# Patient Record
Sex: FEMALE | Race: BLACK OR AFRICAN AMERICAN | NOT HISPANIC OR LATINO
[De-identification: names, ages, dates, MRNs, and addresses within clinical notes are randomized per-mention and may not be internally consistent; named-entity substitution may affect disease eponyms.]

---

## 2022-04-20 ENCOUNTER — APPOINTMENT (OUTPATIENT)
Dept: OBGYN | Facility: CLINIC | Age: 32
End: 2022-04-20
Payer: COMMERCIAL

## 2022-04-20 ENCOUNTER — NON-APPOINTMENT (OUTPATIENT)
Age: 32
End: 2022-04-20

## 2022-04-20 ENCOUNTER — TRANSCRIPTION ENCOUNTER (OUTPATIENT)
Age: 32
End: 2022-04-20

## 2022-04-20 ENCOUNTER — APPOINTMENT (OUTPATIENT)
Dept: OBGYN | Facility: CLINIC | Age: 32
End: 2022-04-20
Payer: SELF-PAY

## 2022-04-20 ENCOUNTER — ASOB RESULT (OUTPATIENT)
Age: 32
End: 2022-04-20

## 2022-04-20 VITALS
SYSTOLIC BLOOD PRESSURE: 132 MMHG | WEIGHT: 167 LBS | HEART RATE: 101 BPM | BODY MASS INDEX: 29.59 KG/M2 | DIASTOLIC BLOOD PRESSURE: 89 MMHG | HEIGHT: 63 IN | TEMPERATURE: 98 F

## 2022-04-20 DIAGNOSIS — Z78.9 OTHER SPECIFIED HEALTH STATUS: ICD-10-CM

## 2022-04-20 DIAGNOSIS — Z82.49 FAMILY HISTORY OF ISCHEMIC HEART DISEASE AND OTHER DISEASES OF THE CIRCULATORY SYSTEM: ICD-10-CM

## 2022-04-20 DIAGNOSIS — Z32.01 ENCOUNTER FOR PREGNANCY TEST, RESULT POSITIVE: ICD-10-CM

## 2022-04-20 DIAGNOSIS — N91.2 AMENORRHEA, UNSPECIFIED: ICD-10-CM

## 2022-04-20 DIAGNOSIS — Z83.3 FAMILY HISTORY OF DIABETES MELLITUS: ICD-10-CM

## 2022-04-20 PROBLEM — Z00.00 ENCOUNTER FOR PREVENTIVE HEALTH EXAMINATION: Status: ACTIVE | Noted: 2022-04-20

## 2022-04-20 LAB
BILIRUB UR QL STRIP: NORMAL
CLARITY UR: CLEAR
COLLECTION METHOD: NORMAL
GLUCOSE UR-MCNC: NORMAL
HCG UR QL: 0.2 EU/DL
HCG UR QL: POSITIVE
HGB UR QL STRIP.AUTO: ABNORMAL
KETONES UR-MCNC: NORMAL
LEUKOCYTE ESTERASE UR QL STRIP: ABNORMAL
NITRITE UR QL STRIP: NORMAL
PH UR STRIP: 6
PROT UR STRIP-MCNC: NORMAL
QUALITY CONTROL: YES
SP GR UR STRIP: 1.01

## 2022-04-20 PROCEDURE — 76817 TRANSVAGINAL US OBSTETRIC: CPT

## 2022-04-20 PROCEDURE — 81003 URINALYSIS AUTO W/O SCOPE: CPT | Mod: QW

## 2022-04-20 PROCEDURE — 99204 OFFICE O/P NEW MOD 45 MIN: CPT

## 2022-04-20 PROCEDURE — 99072 ADDL SUPL MATRL&STAF TM PHE: CPT

## 2022-04-20 PROCEDURE — 81025 URINE PREGNANCY TEST: CPT

## 2022-04-20 RX ORDER — PRENATAL VIT 49/IRON FUM/FOLIC 6.75-0.2MG
TABLET ORAL
Refills: 0 | Status: ACTIVE | COMMUNITY

## 2022-04-20 NOTE — HISTORY OF PRESENT ILLNESS
[Patient reported PAP Smear was normal] : Patient reported PAP Smear was normal [Y] : Positive pregnancy history [FreeTextEntry1] : 31 yo G2 P-0010 LMP- 2-6-2022 IS here for initial visit , prenatal care \par \par obhx - sab 6 wks  10/2021\par  no rashes or viral iness since lmp, \par 2/28 had a drink 1 mixed\par no sti\par no genetic d/s  \par patient and partner from [place]  [TextBox_4] : h/o fibroids , no sti , no cysts\par  \par 11/30/4-5 \par  last pap 4/1/2022, nl in 2019  [Papeardate] : 4-2022 [PGxTotal] : 1 [[facility]xLiving] : 0 [PGHxABSpont] : 1

## 2022-04-20 NOTE — DISCUSSION/SUMMARY
[FreeTextEntry1] : 33 yo p0010 AT 10.3 WKS IUP for prenatal care lmp 2/6 KALIN 11/13/22\par \par sono - siup at 10.6 wks kalin 11/10\par prenatal labs \par panorama\par NT in 2-3 wks\par pregnanc counseling\par records from dr Kyung Pres[place]\par rto 1 mo\par

## 2022-04-20 NOTE — PHYSICAL EXAM
[Appropriately responsive] : appropriately responsive [Alert] : alert [No Acute Distress] : no acute distress [No Lymphadenopathy] : no lymphadenopathy [Soft] : soft [Non-tender] : non-tender [Non-distended] : non-distended [No HSM] : No HSM [No Lesions] : no lesions [No Mass] : no mass [Oriented x3] : oriented x3 [FreeTextEntry6] : w

## 2022-04-20 NOTE — PROCEDURE
[Amenorrhea] : Amenorrhea [Transvaginal Ultrasound] : transvaginal ultrasound [FreeTextEntry4] : siup at 10.6 wks brenton 11/10/22 fhr pos

## 2022-04-21 LAB
ABO + RH PNL BLD: NORMAL
BASOPHILS # BLD AUTO: 0.03 K/UL
BASOPHILS NFR BLD AUTO: 0.3 %
BLD GP AB SCN SERPL QL: NORMAL
EOSINOPHIL # BLD AUTO: 0 K/UL
EOSINOPHIL NFR BLD AUTO: 0 %
HCT VFR BLD CALC: 37.7 %
HGB BLD-MCNC: 11.8 G/DL
HIV1+2 AB SPEC QL IA.RAPID: NONREACTIVE
IMM GRANULOCYTES NFR BLD AUTO: 0.3 %
LYMPHOCYTES # BLD AUTO: 1.85 K/UL
LYMPHOCYTES NFR BLD AUTO: 15.5 %
MAN DIFF?: NORMAL
MCHC RBC-ENTMCNC: 31.3 G/DL
MCHC RBC-ENTMCNC: 32.3 PG
MCV RBC AUTO: 103.3 FL
MONOCYTES # BLD AUTO: 0.69 K/UL
MONOCYTES NFR BLD AUTO: 5.8 %
NEUTROPHILS # BLD AUTO: 9.3 K/UL
NEUTROPHILS NFR BLD AUTO: 78.1 %
PLATELET # BLD AUTO: 286 K/UL
RBC # BLD: 3.65 M/UL
RBC # FLD: 13.2 %
TSH SERPL-ACNC: 1.68 UIU/ML
WBC # FLD AUTO: 11.91 K/UL

## 2022-04-22 LAB
HBV SURFACE AB SER QL: REACTIVE
HBV SURFACE AG SER QL: NONREACTIVE
HCV AB SER QL: NONREACTIVE
HCV S/CO RATIO: 0.13 S/CO
LEAD BLD-MCNC: <1 UG/DL
MEV IGG FLD QL IA: >300 AU/ML
MEV IGG+IGM SER-IMP: POSITIVE
RUBV IGG FLD-ACNC: 10.2 INDEX
RUBV IGG SER-IMP: POSITIVE
T GONDII AB SER-IMP: NEGATIVE
T GONDII AB SER-IMP: NEGATIVE
T GONDII IGG SER QL: <3 IU/ML
T GONDII IGM SER QL: <3 AU/ML
T PALLIDUM AB SER QL IA: NEGATIVE
VZV AB TITR SER: NEGATIVE
VZV IGG SER IF-ACNC: 23.8 INDEX

## 2022-04-26 LAB
HGB A MFR BLD: 97.5 %
HGB A2 MFR BLD: 2.5 %
HGB FRACT BLD-IMP: NORMAL

## 2022-04-27 DIAGNOSIS — O23.40 UNSPECIFIED INFECTION OF URINARY TRACT IN PREGNANCY, UNSPECIFIED TRIMESTER: ICD-10-CM

## 2022-04-27 LAB
AR GENE MUT ANL BLD/T: NORMAL
FMR1 GENE MUT ANL BLD/T: NORMAL

## 2022-04-27 RX ORDER — NITROFURANTOIN (MONOHYDRATE/MACROCRYSTALS) 25; 75 MG/1; MG/1
100 CAPSULE ORAL
Qty: 14 | Refills: 0 | Status: ACTIVE | COMMUNITY
Start: 2022-04-27 | End: 1900-01-01

## 2022-05-02 LAB — CFTR MUT TESTED BLD/T: NEGATIVE

## 2022-05-11 ENCOUNTER — OUTPATIENT (OUTPATIENT)
Dept: OUTPATIENT SERVICES | Facility: HOSPITAL | Age: 32
LOS: 1 days | Discharge: HOME | End: 2022-05-11

## 2022-05-11 ENCOUNTER — ASOB RESULT (OUTPATIENT)
Age: 32
End: 2022-05-11

## 2022-05-11 ENCOUNTER — APPOINTMENT (OUTPATIENT)
Dept: ANTEPARTUM | Facility: CLINIC | Age: 32
End: 2022-05-11
Payer: COMMERCIAL

## 2022-05-11 PROCEDURE — 76813 OB US NUCHAL MEAS 1 GEST: CPT | Mod: 26

## 2022-05-18 ENCOUNTER — EMERGENCY (EMERGENCY)
Facility: HOSPITAL | Age: 32
LOS: 0 days | Discharge: HOME | End: 2022-05-19
Attending: EMERGENCY MEDICINE | Admitting: EMERGENCY MEDICINE
Payer: COMMERCIAL

## 2022-05-18 VITALS
SYSTOLIC BLOOD PRESSURE: 147 MMHG | RESPIRATION RATE: 16 BRPM | WEIGHT: 166.89 LBS | OXYGEN SATURATION: 100 % | DIASTOLIC BLOOD PRESSURE: 81 MMHG | HEART RATE: 130 BPM

## 2022-05-18 DIAGNOSIS — Z87.59 PERSONAL HISTORY OF OTHER COMPLICATIONS OF PREGNANCY, CHILDBIRTH AND THE PUERPERIUM: ICD-10-CM

## 2022-05-18 DIAGNOSIS — O34.12 MATERNAL CARE FOR BENIGN TUMOR OF CORPUS UTERI, SECOND TRIMESTER: ICD-10-CM

## 2022-05-18 DIAGNOSIS — O99.891 OTHER SPECIFIED DISEASES AND CONDITIONS COMPLICATING PREGNANCY: ICD-10-CM

## 2022-05-18 DIAGNOSIS — R10.2 PELVIC AND PERINEAL PAIN: ICD-10-CM

## 2022-05-18 DIAGNOSIS — Z3A.14 14 WEEKS GESTATION OF PREGNANCY: ICD-10-CM

## 2022-05-18 DIAGNOSIS — O20.9 HEMORRHAGE IN EARLY PREGNANCY, UNSPECIFIED: ICD-10-CM

## 2022-05-18 DIAGNOSIS — D25.1 INTRAMURAL LEIOMYOMA OF UTERUS: ICD-10-CM

## 2022-05-18 PROCEDURE — 99285 EMERGENCY DEPT VISIT HI MDM: CPT

## 2022-05-18 PROCEDURE — 93010 ELECTROCARDIOGRAM REPORT: CPT

## 2022-05-18 NOTE — ED PROVIDER NOTE - NS ED ATTENDING STATEMENT MOD
This was a shared visit with the SARA. I reviewed and verified the documentation and independently performed the documented:

## 2022-05-18 NOTE — ED PROVIDER NOTE - OBJECTIVE STATEMENT
32 year old female, ~ 14 weeks pregnant, lmp 2/6 presents to ed with vaginal bleeding x few hours. pt admits went through 1 panty liner. Mild pelvic cramping. No nausea, vomiting, diarrhea, fever, chills, chest pain or sob. no urinary symptoms. OB - dr maza

## 2022-05-18 NOTE — ED ADULT TRIAGE NOTE - CHIEF COMPLAINT QUOTE
pt c/o vaginal bleeding pt 14 weeks pregnant  LMP 2/6 ; recent ultrasound done and was told everything was ok; pt states she is having pelvic cramping and lower back pain; pt states she went through one panty liner today

## 2022-05-18 NOTE — ED PROVIDER NOTE - ATTENDING APP SHARED VISIT CONTRIBUTION OF CARE
pt co ab cramps, mild bleeding today. 14 weeks preg. hx of miscarriage in Oct. no fever, vomiting. no uti sx.  pt in nad, ab soft, nt.     labs, US for FH, supportive care    outpt follow up

## 2022-05-18 NOTE — ED PROVIDER NOTE - NS ED ROS FT
Review of Systems:  	•	CONSTITUTIONAL - no fever, no diaphoresis, no chills  	•	SKIN - no rash  	•	HEMATOLOGIC - + bleeding, no bruising  	•	EYES - no eye pain, no blurry vision  	•	ENT - no change in hearing, no sore throat, no ear pain or tinnitus  	•	RESPIRATORY - no shortness of breath, no cough  	•	CARDIAC - no chest pain, no palpitations  	•	GI - no abd pain, no nausea, no vomiting, no diarrhea, no constipation  	•	GENITO-URINARY - no discharge, no dysuria; no hematuria, no increased urinary frequency  	•	MUSCULOSKELETAL - no joint paint, no swelling, no redness  	•	NEUROLOGIC - no weakness, no headache, no paresthesias, no LOC  	•	PSYCH - no anxiety, non suicidal, non homicidal, no hallucination, no depression

## 2022-05-18 NOTE — ED PROVIDER NOTE - PATIENT PORTAL LINK FT
You can access the FollowMyHealth Patient Portal offered by [facility] by registering at the following website: [email or web address]. By joining [facility]’s FollowMyHealth portal, you will also be able to view your health information using other applications (apps) compatible with our system.

## 2022-05-19 VITALS — HEART RATE: 105 BPM

## 2022-05-19 LAB
ALBUMIN SERPL ELPH-MCNC: 4.3 G/DL — SIGNIFICANT CHANGE UP (ref 3.5–5.2)
ALP SERPL-CCNC: 134 U/L — HIGH (ref 30–115)
ALT FLD-CCNC: 44 U/L — HIGH (ref 0–41)
ANION GAP SERPL CALC-SCNC: 16 MMOL/L — HIGH (ref 7–14)
APPEARANCE UR: CLEAR — SIGNIFICANT CHANGE UP
AST SERPL-CCNC: 24 U/L — SIGNIFICANT CHANGE UP (ref 0–41)
BACTERIA # UR AUTO: NEGATIVE — SIGNIFICANT CHANGE UP
BASOPHILS # BLD AUTO: 0.03 K/UL — SIGNIFICANT CHANGE UP (ref 0–0.2)
BASOPHILS NFR BLD AUTO: 0.2 % — SIGNIFICANT CHANGE UP (ref 0–1)
BILIRUB SERPL-MCNC: 0.2 MG/DL — SIGNIFICANT CHANGE UP (ref 0.2–1.2)
BILIRUB UR-MCNC: NEGATIVE — SIGNIFICANT CHANGE UP
BLD GP AB SCN SERPL QL: SIGNIFICANT CHANGE UP
BUN SERPL-MCNC: 8 MG/DL — LOW (ref 10–20)
CALCIUM SERPL-MCNC: 10 MG/DL — SIGNIFICANT CHANGE UP (ref 8.5–10.1)
CHLORIDE SERPL-SCNC: 101 MMOL/L — SIGNIFICANT CHANGE UP (ref 98–110)
CO2 SERPL-SCNC: 18 MMOL/L — SIGNIFICANT CHANGE UP (ref 17–32)
COLOR SPEC: COLORLESS — SIGNIFICANT CHANGE UP
CREAT SERPL-MCNC: 0.6 MG/DL — LOW (ref 0.7–1.5)
DIFF PNL FLD: ABNORMAL
EGFR: 123 ML/MIN/1.73M2 — SIGNIFICANT CHANGE UP
EOSINOPHIL # BLD AUTO: 0 K/UL — SIGNIFICANT CHANGE UP (ref 0–0.7)
EOSINOPHIL NFR BLD AUTO: 0 % — SIGNIFICANT CHANGE UP (ref 0–8)
EPI CELLS # UR: 1 /HPF — SIGNIFICANT CHANGE UP (ref 0–5)
GLUCOSE SERPL-MCNC: 93 MG/DL — SIGNIFICANT CHANGE UP (ref 70–99)
GLUCOSE UR QL: NEGATIVE — SIGNIFICANT CHANGE UP
HCG SERPL-ACNC: HIGH MIU/ML
HCT VFR BLD CALC: 35.3 % — LOW (ref 37–47)
HGB BLD-MCNC: 11.8 G/DL — LOW (ref 12–16)
HYALINE CASTS # UR AUTO: 0 /LPF — SIGNIFICANT CHANGE UP (ref 0–7)
IMM GRANULOCYTES NFR BLD AUTO: 1.2 % — HIGH (ref 0.1–0.3)
KETONES UR-MCNC: ABNORMAL
LEUKOCYTE ESTERASE UR-ACNC: NEGATIVE — SIGNIFICANT CHANGE UP
LYMPHOCYTES # BLD AUTO: 1.81 K/UL — SIGNIFICANT CHANGE UP (ref 1.2–3.4)
LYMPHOCYTES # BLD AUTO: 11.1 % — LOW (ref 20.5–51.1)
MCHC RBC-ENTMCNC: 31.9 PG — HIGH (ref 27–31)
MCHC RBC-ENTMCNC: 33.4 G/DL — SIGNIFICANT CHANGE UP (ref 32–37)
MCV RBC AUTO: 95.4 FL — SIGNIFICANT CHANGE UP (ref 81–99)
MONOCYTES # BLD AUTO: 0.96 K/UL — HIGH (ref 0.1–0.6)
MONOCYTES NFR BLD AUTO: 5.9 % — SIGNIFICANT CHANGE UP (ref 1.7–9.3)
NEUTROPHILS # BLD AUTO: 13.25 K/UL — HIGH (ref 1.4–6.5)
NEUTROPHILS NFR BLD AUTO: 81.6 % — HIGH (ref 42.2–75.2)
NITRITE UR-MCNC: NEGATIVE — SIGNIFICANT CHANGE UP
NRBC # BLD: 0 /100 WBCS — SIGNIFICANT CHANGE UP (ref 0–0)
PH UR: 6 — SIGNIFICANT CHANGE UP (ref 5–8)
PLATELET # BLD AUTO: 334 K/UL — SIGNIFICANT CHANGE UP (ref 130–400)
POTASSIUM SERPL-MCNC: 4.3 MMOL/L — SIGNIFICANT CHANGE UP (ref 3.5–5)
POTASSIUM SERPL-SCNC: 4.3 MMOL/L — SIGNIFICANT CHANGE UP (ref 3.5–5)
PROT SERPL-MCNC: 8.1 G/DL — HIGH (ref 6–8)
PROT UR-MCNC: NEGATIVE — SIGNIFICANT CHANGE UP
RBC # BLD: 3.7 M/UL — LOW (ref 4.2–5.4)
RBC # FLD: 12.8 % — SIGNIFICANT CHANGE UP (ref 11.5–14.5)
RBC CASTS # UR COMP ASSIST: 2 /HPF — SIGNIFICANT CHANGE UP (ref 0–4)
SODIUM SERPL-SCNC: 135 MMOL/L — SIGNIFICANT CHANGE UP (ref 135–146)
SP GR SPEC: 1.01 — LOW (ref 1.01–1.03)
UROBILINOGEN FLD QL: SIGNIFICANT CHANGE UP
WBC # BLD: 16.24 K/UL — HIGH (ref 4.8–10.8)
WBC # FLD AUTO: 16.24 K/UL — HIGH (ref 4.8–10.8)
WBC UR QL: 1 /HPF — SIGNIFICANT CHANGE UP (ref 0–5)

## 2022-05-19 PROCEDURE — 76815 OB US LIMITED FETUS(S): CPT | Mod: 26

## 2022-05-19 RX ORDER — SODIUM CHLORIDE 9 MG/ML
1000 INJECTION INTRAMUSCULAR; INTRAVENOUS; SUBCUTANEOUS ONCE
Refills: 0 | Status: COMPLETED | OUTPATIENT
Start: 2022-05-19 | End: 2022-05-19

## 2022-05-19 RX ADMIN — SODIUM CHLORIDE 1000 MILLILITER(S): 9 INJECTION INTRAMUSCULAR; INTRAVENOUS; SUBCUTANEOUS at 01:04

## 2022-05-20 LAB
CULTURE RESULTS: SIGNIFICANT CHANGE UP
SPECIMEN SOURCE: SIGNIFICANT CHANGE UP

## 2022-05-24 ENCOUNTER — APPOINTMENT (OUTPATIENT)
Dept: OBGYN | Facility: CLINIC | Age: 32
End: 2022-05-24
Payer: COMMERCIAL

## 2022-05-24 ENCOUNTER — NON-APPOINTMENT (OUTPATIENT)
Age: 32
End: 2022-05-24

## 2022-05-24 VITALS
DIASTOLIC BLOOD PRESSURE: 78 MMHG | SYSTOLIC BLOOD PRESSURE: 107 MMHG | TEMPERATURE: 97.3 F | HEART RATE: 91 BPM | HEIGHT: 63 IN | WEIGHT: 165 LBS | BODY MASS INDEX: 29.23 KG/M2

## 2022-05-24 LAB
BILIRUB UR QL STRIP: NORMAL
CLARITY UR: CLEAR
COLLECTION METHOD: NORMAL
GLUCOSE UR-MCNC: NORMAL
HCG UR QL: 0.2 EU/DL
HGB UR QL STRIP.AUTO: ABNORMAL
KETONES UR-MCNC: NORMAL
LEUKOCYTE ESTERASE UR QL STRIP: ABNORMAL
NITRITE UR QL STRIP: NORMAL
PH UR STRIP: 6
PROT UR STRIP-MCNC: NORMAL
SP GR UR STRIP: 1.01

## 2022-05-24 PROCEDURE — 81003 URINALYSIS AUTO W/O SCOPE: CPT | Mod: QW

## 2022-05-24 PROCEDURE — 0500F INITIAL PRENATAL CARE VISIT: CPT

## 2022-05-25 ENCOUNTER — NON-APPOINTMENT (OUTPATIENT)
Age: 32
End: 2022-05-25

## 2022-05-25 ENCOUNTER — EMERGENCY (EMERGENCY)
Facility: HOSPITAL | Age: 32
LOS: 0 days | Discharge: HOME | End: 2022-05-26
Attending: EMERGENCY MEDICINE | Admitting: EMERGENCY MEDICINE
Payer: COMMERCIAL

## 2022-05-25 VITALS
TEMPERATURE: 99 F | DIASTOLIC BLOOD PRESSURE: 73 MMHG | RESPIRATION RATE: 20 BRPM | SYSTOLIC BLOOD PRESSURE: 147 MMHG | WEIGHT: 164.91 LBS | HEIGHT: 64 IN | HEART RATE: 117 BPM | OXYGEN SATURATION: 100 %

## 2022-05-25 VITALS
TEMPERATURE: 98 F | HEART RATE: 91 BPM | DIASTOLIC BLOOD PRESSURE: 76 MMHG | OXYGEN SATURATION: 100 % | RESPIRATION RATE: 20 BRPM | SYSTOLIC BLOOD PRESSURE: 110 MMHG

## 2022-05-25 DIAGNOSIS — O20.9 HEMORRHAGE IN EARLY PREGNANCY, UNSPECIFIED: ICD-10-CM

## 2022-05-25 DIAGNOSIS — O20.0 THREATENED ABORTION: ICD-10-CM

## 2022-05-25 DIAGNOSIS — Z3A.15 15 WEEKS GESTATION OF PREGNANCY: ICD-10-CM

## 2022-05-25 DIAGNOSIS — D25.9 LEIOMYOMA OF UTERUS, UNSPECIFIED: ICD-10-CM

## 2022-05-25 DIAGNOSIS — Z87.42 PERSONAL HISTORY OF OTHER DISEASES OF THE FEMALE GENITAL TRACT: ICD-10-CM

## 2022-05-25 DIAGNOSIS — O34.12 MATERNAL CARE FOR BENIGN TUMOR OF CORPUS UTERI, SECOND TRIMESTER: ICD-10-CM

## 2022-05-25 DIAGNOSIS — R10.9 UNSPECIFIED ABDOMINAL PAIN: ICD-10-CM

## 2022-05-25 LAB
ALBUMIN SERPL ELPH-MCNC: 4.2 G/DL — SIGNIFICANT CHANGE UP (ref 3.5–5.2)
ALP SERPL-CCNC: 164 U/L — HIGH (ref 30–115)
ALT FLD-CCNC: 56 U/L — HIGH (ref 0–41)
ANION GAP SERPL CALC-SCNC: 11 MMOL/L — SIGNIFICANT CHANGE UP (ref 7–14)
APPEARANCE UR: CLEAR — SIGNIFICANT CHANGE UP
AST SERPL-CCNC: 45 U/L — HIGH (ref 0–41)
BACTERIA # UR AUTO: ABNORMAL
BASOPHILS # BLD AUTO: 0.03 K/UL — SIGNIFICANT CHANGE UP (ref 0–0.2)
BASOPHILS NFR BLD AUTO: 0.2 % — SIGNIFICANT CHANGE UP (ref 0–1)
BILIRUB SERPL-MCNC: 0.2 MG/DL — SIGNIFICANT CHANGE UP (ref 0.2–1.2)
BILIRUB UR-MCNC: NEGATIVE — SIGNIFICANT CHANGE UP
BUN SERPL-MCNC: 9 MG/DL — LOW (ref 10–20)
CALCIUM SERPL-MCNC: 10.1 MG/DL — SIGNIFICANT CHANGE UP (ref 8.5–10.1)
CHLORIDE SERPL-SCNC: 106 MMOL/L — SIGNIFICANT CHANGE UP (ref 98–110)
CO2 SERPL-SCNC: 19 MMOL/L — SIGNIFICANT CHANGE UP (ref 17–32)
COLOR SPEC: COLORLESS — SIGNIFICANT CHANGE UP
CREAT SERPL-MCNC: 0.6 MG/DL — LOW (ref 0.7–1.5)
DIFF PNL FLD: ABNORMAL
EGFR: 123 ML/MIN/1.73M2 — SIGNIFICANT CHANGE UP
EOSINOPHIL # BLD AUTO: 0 K/UL — SIGNIFICANT CHANGE UP (ref 0–0.7)
EOSINOPHIL NFR BLD AUTO: 0 % — SIGNIFICANT CHANGE UP (ref 0–8)
EPI CELLS # UR: SIGNIFICANT CHANGE UP
GLUCOSE SERPL-MCNC: 91 MG/DL — SIGNIFICANT CHANGE UP (ref 70–99)
GLUCOSE UR QL: NEGATIVE — SIGNIFICANT CHANGE UP
HCT VFR BLD CALC: 33.2 % — LOW (ref 37–47)
HGB BLD-MCNC: 11.2 G/DL — LOW (ref 12–16)
IMM GRANULOCYTES NFR BLD AUTO: 0.7 % — HIGH (ref 0.1–0.3)
KETONES UR-MCNC: NEGATIVE — SIGNIFICANT CHANGE UP
LEUKOCYTE ESTERASE UR-ACNC: ABNORMAL
LYMPHOCYTES # BLD AUTO: 1.91 K/UL — SIGNIFICANT CHANGE UP (ref 1.2–3.4)
LYMPHOCYTES # BLD AUTO: 14.3 % — LOW (ref 20.5–51.1)
MCHC RBC-ENTMCNC: 31.8 PG — HIGH (ref 27–31)
MCHC RBC-ENTMCNC: 33.7 G/DL — SIGNIFICANT CHANGE UP (ref 32–37)
MCV RBC AUTO: 94.3 FL — SIGNIFICANT CHANGE UP (ref 81–99)
MONOCYTES # BLD AUTO: 0.77 K/UL — HIGH (ref 0.1–0.6)
MONOCYTES NFR BLD AUTO: 5.8 % — SIGNIFICANT CHANGE UP (ref 1.7–9.3)
NEUTROPHILS # BLD AUTO: 10.56 K/UL — HIGH (ref 1.4–6.5)
NEUTROPHILS NFR BLD AUTO: 79 % — HIGH (ref 42.2–75.2)
NITRITE UR-MCNC: NEGATIVE — SIGNIFICANT CHANGE UP
NRBC # BLD: 0 /100 WBCS — SIGNIFICANT CHANGE UP (ref 0–0)
PH UR: 6.5 — SIGNIFICANT CHANGE UP (ref 5–8)
PLATELET # BLD AUTO: 442 K/UL — HIGH (ref 130–400)
POTASSIUM SERPL-MCNC: 5.1 MMOL/L — HIGH (ref 3.5–5)
POTASSIUM SERPL-SCNC: 5.1 MMOL/L — HIGH (ref 3.5–5)
PROT SERPL-MCNC: 8.2 G/DL — HIGH (ref 6–8)
PROT UR-MCNC: NEGATIVE — SIGNIFICANT CHANGE UP
RBC # BLD: 3.52 M/UL — LOW (ref 4.2–5.4)
RBC # FLD: 12.4 % — SIGNIFICANT CHANGE UP (ref 11.5–14.5)
RBC CASTS # UR COMP ASSIST: SIGNIFICANT CHANGE UP /HPF (ref 0–4)
SODIUM SERPL-SCNC: 136 MMOL/L — SIGNIFICANT CHANGE UP (ref 135–146)
SP GR SPEC: 1 — LOW (ref 1.01–1.03)
UROBILINOGEN FLD QL: SIGNIFICANT CHANGE UP
WBC # BLD: 13.36 K/UL — HIGH (ref 4.8–10.8)
WBC # FLD AUTO: 13.36 K/UL — HIGH (ref 4.8–10.8)
WBC UR QL: SIGNIFICANT CHANGE UP /HPF (ref 0–5)

## 2022-05-25 PROCEDURE — 99285 EMERGENCY DEPT VISIT HI MDM: CPT

## 2022-05-25 PROCEDURE — 76815 OB US LIMITED FETUS(S): CPT | Mod: 26

## 2022-05-25 NOTE — ED ADULT TRIAGE NOTE - CHIEF COMPLAINT QUOTE
I'm 15 weeks pregnant and I'm bleeding. I had a check up yesterday and everything was fine - patient

## 2022-05-25 NOTE — ED PROVIDER NOTE - PROGRESS NOTE DETAILS
GYN team in ED and evaluated, request official sono.   bedside sono performed. + FH and + fetal activity

## 2022-05-25 NOTE — ED PROVIDER NOTE - CARE PROVIDER_API CALL
Fiorella Pearson)  Obstetrics and Gynecology  [address] [place] [address], [address]  [place] [address]  Phone: [phone]  Fax: [phone]  Follow Up Time:

## 2022-05-25 NOTE — CONSULT NOTE ADULT - ASSESSMENT
INCOMPLETE NOTE 31yo  @15w3d, with vaginal bleeding, IUP with FH noted, pending work-up    INCOMPLETE NOTE 33yo  @15w3d, with vaginal bleeding, IUP with FH noted, currently clinically and hemodynamically stable    INCOMPLETE NOTE 31yo  @15w3d, with vaginal bleeding, IUP with FH noted, currently clinically and hemodynamically stable    -no acute ob intervention  -recommend tylenol for pain  -bleeding and pain precautions given  -please follow up with Dr. Pearson next week  -dispo per ED    Dr. Wong and Dr. Pearson aware

## 2022-05-25 NOTE — ED PROVIDER NOTE - NSFOLLOWUPINSTRUCTIONS_ED_ALL_ED_FT
Vaginal Bleeding During Pregnancy, Second Trimester    A small amount of bleeding from the vagina, or spotting, is common during early pregnancy. Some bleeding may be related to the pregnancy, and some may not. In many cases, the bleeding is normal and is not a problem. However, bleeding can also be a sign of something serious.    Normal things may cause bleeding during the second trimester. They include:  •Rapid changes in blood vessels. This is caused by changes that are happening to the body during pregnancy.  •Sex.  •Pelvic exams.    Abnormal things that may cause bleeding during the second trimester include:  •Infection, inflammation, or growths on the cervix. The growths on the cervix are also called polyps.  •A condition in which the placenta partially or completely covers the opening of the cervix (placenta previa).  •The placenta  from the uterus (placenta abruption).  •Miscarriage.  •Early labor, also called  labor.  •The cervix opening and thinning before pregnancy is at term and before labor starts (cervical insufficiency).  •A mass of tissue developing in the uterus due to an egg being fertilized incorrectly (molar pregnancy).    Tell your health care provider about any vaginal bleeding right away.    Follow these instructions at home:    Monitoring your bleeding      Monitor your bleeding.  •Pay attention to any changes in your symptoms. Let your health care provider know about any concerns.  •Try to understand when the bleeding occurs. Does the bleeding start on its own, or does it start after something is done, such as sex or a pelvic exam?  •Use a diary to record the things you see about your bleeding, including:  •The kind of bleeding you are having. Does the bleeding start and stop irregularly, or is it a constant flow?  •The severity of your bleeding. Is the bleeding heavy or light?  •The number of pads you use each day, how often you change them, and how soaked they are.  •Tell your health care provider if you pass tissue. He or she may want to see it.    Activity   •Follow instructions from your health care provider about limiting your activity. Ask what activities are safe for you.  • Do not exercise or do activities that take a lot of effort unless your health care provider approves.  • Do not have sex until your health care provider says that this is safe.  • Do not lift anything that is heavier than 10 lb (4.5 kg), or the limit that you are told, until your health care provider says that it is safe.  •If needed, make plans for someone to help with your regular activities.    Medicines   •Take over-the-counter and prescription medicines only as told by your health care provider.  • Do not take aspirin because it can cause bleeding.    General instructions   • Do not use tampons or douche.  •Keep all follow-up visits. This is important.    Contact a health care provider if:  •You have vaginal bleeding during any time of your pregnancy.  •You have cramps or labor pains.  •You have a fever that does not get better when you take medicines.    Get help right away if:  •You have severe cramps in your back or abdomen.  •You have contractions.  •You have chills.  •Your bleeding increases or you pass large clots or a large amount of tissue from your vagina.  •You feel light-headed or weak, or you faint.  •You are leaking fluid or have a gush of fluid from your vagina.    Summary  •A small amount of bleeding, or spotting, from the vagina is common during early pregnancy.  •Many things can cause vaginal bleeding during pregnancy. Tell your health care provider about any bleeding right away.  •Try to understand when bleeding occurs. Does bleeding occur on its own, or does it occur after something is done, such as sex or pelvic exams?  •Follow instructions from your health care provider about limiting your activity. Ask what activities are safe for you.  •Keep all follow-up visits. This is important.    This information is not intended to replace advice given to you by your health care provider. Make sure you discuss any questions you have with your health care provider.      Uterine Fibroids    Uterine fibroids, also called leiomyomas, are noncancerous (benign) tumors that can grow in the uterus. They can cause heavy menstrual bleeding and pain. Fibroids may also grow in the fallopian tubes, cervix, or tissues (ligaments) near the uterus.    You may have one or many fibroids. Fibroids vary in size, weight, and where they grow in the uterus. Some can become quite large. Most fibroids do not require medical treatment.      What are the causes?  The cause of this condition is not known.    What increases the risk?    You are more likely to develop this condition if you:  •Are in your 30s or 40s and have not gone through menopause.  •Have a family history of this condition.  •Are of  descent.  •Started your menstrual period at age 10 or younger.  •Have never given birth.  •Are overweight or obese.    What are the signs or symptoms?    Many women do not have any symptoms. Symptoms of this condition may include:  •Heavy menstrual bleeding.  •Bleeding between menstrual periods.  •Pain and pressure in the pelvic area, between your hip bones.  •Pain during sex.  •Bladder problems, such as needing to urinate right away or more often than usual.  •Inability to have children (infertility).  •Failure to carry pregnancy to term (miscarriage).    How is this diagnosed?    This condition may be diagnosed based on:  •Your symptoms and medical history.  •A physical exam.  •A pelvic exam that includes feeling for any tumors.  •Imaging tests, such as ultrasound or MRI.    How is this treated?    Treatment for this condition may include follow-up visits with your health care provider to monitor your fibroids for any changes. Other treatment may include:•Medicines, such as:   •Medicines to relieve pain, including aspirin and NSAIDs, such as ibuprofen or naproxen.  •Hormone therapy. Treatment may be given as a pill or an injection, or it may be inserted into the uterus using an intrauterine device (IUD).  •Surgery that would do one of the following:  •Remove the fibroids (myomectomy). This may be recommended if fibroids affect your fertility and you want to become pregnant.  •Remove the uterus (hysterectomy).  •Block the blood supply to the fibroids (uterine artery embolization). This can cause them to shrink and die.    Follow these instructions at home:    Medicines   •Take over-the-counter and prescription medicines only as told by your health care provider.  •Ask your health care provider if you should take iron pills or eat more iron-rich foods, such as dark green, leafy vegetables. Heavy menstrual bleeding can cause low iron levels.    Managing pain   If directed, apply heat to your back or abdomen to reduce pain. Use the heat source that your health care provider recommends, such as a moist heat pack or a heating pad. To apply heat:  •Place a towel between your skin and the heat source.  •Leave the heat on for 20–30 minutes.  •Remove the heat if your skin turns bright red. This is especially important if you are unable to feel pain, heat, or cold. You may have a greater risk of getting burned.    General instructions   •Pay close attention to your menstrual cycle. Tell your health care provider about any changes, such as:  •Heavier bleeding that requires you to change your pads or tampons more than usual.  •A change in the number of days that your menstrual period lasts.  •A change in symptoms that come with your menstrual period, such as back pain or cramps in your abdomen.  •Keep all follow-up visits. This is important, especially if your fibroids need to be monitored for any changes.      Contact a health care provider if you:  •Have pelvic pain, back pain, or cramps in your abdomen that do not get better with medicine or heat.  •Develop new bleeding between menstrual periods.  •Have increased bleeding during or between menstrual periods.  •Feel more tired or weak than usual.  •Feel light-headed.    Get help right away if you:  •Faint.  •Have pelvic pain that suddenly gets worse.  •Have severe vaginal bleeding that soaks a tampon or pad in 30 minutes or less.    Summary  •Uterine fibroids are noncancerous (benign) tumors that can develop in the uterus.  •The exact cause of this condition is not known.  •Most fibroids do not require medical treatment unless they affect your ability to have children (fertility).  •Contact a health care provider if you have pelvic pain, back pain, or cramps in your abdomen that do not get better with medicines.  •Get help right away if you faint, have pelvic pain that suddenly gets worse, or have severe vaginal bleeding.    This information is not intended to replace advice given to you by your health care provider. Make sure you discuss any questions you have with your health care provider.

## 2022-05-25 NOTE — ED ADULT NURSE NOTE - OBJECTIVE STATEMENT
Patient accompanied by her spouse c/o vaginal bleeding after 7pm when she got out of her shower. Last GYN visit was yesterday, no abnormality found.

## 2022-05-25 NOTE — ED PROVIDER NOTE - PHYSICAL EXAMINATION
CONSTITUTIONAL: Well-appearing; well-nourished; in no apparent distress.   EYES: PERRL; EOM intact.   CARDIOVASCULAR: Normal S1, S2; no murmurs, rubs, or gallops.   RESPIRATORY: Normal chest excursion with respiration; breath sounds clear and equal bilaterally; no wheezes, rhonchi, or rales.  GI/: Enlarged uterus palpated to lower abdomen.  Normal bowel sounds; non-distended; non-tender; no palpable organomegaly.   MS: No calf swelling and tenderness.  SKIN: Normal for age and race; warm; dry; good turgor; no apparent lesions or exudate.   NEURO/PSYCH: A & O x 4; grossly unremarkable.

## 2022-05-25 NOTE — ED PROVIDER NOTE - OBJECTIVE STATEMENT
32 years old G2, P1 at 15 weeks 3 days pregnant female present complaint of vaginal bleeding.  Patient reports she started feeling lower abdominal cramping and started feeling bleeding.  Denies passing blood clots.  Bleeding stopped prior to ED arrival. Reports she only used 1 pad. patient was evaluated in the ED last week for similar complaints and found to have large fibroid.  Patient otherwise denies fever/chill/HA/dizziness/chest pain/palpitation/sob/abd pain/n/v/d/ black stool/bloody stool/urinary sxs

## 2022-05-25 NOTE — ED PROVIDER NOTE - PATIENT PORTAL LINK FT
You can access the FollowMyHealth Patient Portal offered by [facility] by registering at the following website: [email or web address]. By joining [facility]’s FollowMyHealth portal, you will also be able to view your health information using other applications (apps) compatible with our system.

## 2022-05-25 NOTE — ED PROVIDER NOTE - ATTENDING APP SHARED VISIT CONTRIBUTION OF CARE
pt 15 weeks preg co pelvic cramps and vb. sts slowed down a lot. cramps resolved.  pt called her OB and told to come here. pt seeb by OBGYN shortly after arrival.  nad,  ab s nt  pelvic per OBGYN.    labs, imaging, supportive care

## 2022-05-25 NOTE — CONSULT NOTE ADULT - SUBJECTIVE AND OBJECTIVE BOX
PGY 1 Note:    Chief Complaint: vaginal bleeding    HPI: 31yo  @ 15w3d by LMP 22 with KALIN 22 with vaginal bleeding for the past two hours. Patient states she was taking a shower and notice bright red blood with clots. She endorses some 2/10 abdominal cramping that has since resolved. She was last seen in the ED on  for vaginal spotting which resolved until today. She denies lightheadedness, dizziness, SOB or palpitations. She was treated for UTI in this pregnancy at 10 weeks with resolution of symptoms. Last saw Dr. Pearson yesterday. Denies fevers, chills, nausea, vomiting, diarrhea, constipation, dysuria or urinary frequency or urgency.    Last [place]: two months ago  Last BM: this morning    Ob/Gyn History:                   LMP - 22                   Cycle Length - regular  Denies history of ovarian cysts, uterine fibroids, abnormal paps, or STIs    Home Medications: PNV    No Known Allergies    PAST MEDICAL & SURGICAL HISTORY: none    FAMILY HISTORY: no pertinent hx      SOCIAL HISTORY: Denies cigarette use, alcohol use, or illicit drug use    Vital Signs Last 24 Hrs  T(F): 98.7 (25 May 2022 20:08), Max: 98.7 (25 May 2022 20:08)  HR: 117 (25 May 2022 20:08) (117 - 117)  BP: 147/73 (25 May 2022 20:08) (147/73 - 147/73)  RR: 20 (25 May 2022 20:08) (20 - 20)  Height (cm): 162.6 (22 @ 20:08)  Weight (kg): 74.8 (22 @ 20:08)  BMI (kg/m2): 28.3 (22 @ 20:08)  BSA (m2): 1.8 (22 @ 20:08)    General Appearance - AAOx3, NAD  Heart - S1S2 regular rate and rhythm  Lung - CTA Bilaterally  Abdomen - Soft, nontender, nondistended, no rebound, no rigidity, no guarding, bowel sounds present  External genitalia: normal female genitalia  Internal mucosa: rugaeted, 10 cc dark red clots in the vagina  Cervix: appears closed. no active bleeding  Uterus: 10 week sized, mobile, nontender, no masses  Adnexa: no masses, no tenderness  BSS: , +fetal movement, posterior placenta    Height (cm): 162.6 (22 @ 20:08)  Weight (kg): 74.8 (22 @ 20:08)  BMI (kg/m2): 28.3 (22 @ 20:08)  BSA (m2): 1.8 (22 @ 20:08)    LABS:  PENDING  HCG Quantitative, Serum: 24605.0 mIU/mL (22 @ 23:40)    Culture - Urine (collected 22 @ 01:50)  Source: Clean Catch Clean Catch (Midstream)  Final Report (22 @ 14:20):    >=3 organisms. Probable collection contamination.    RADIOLOGY & ADDITIONAL STUDIES:  pending PGY 1 Note:    Chief Complaint: vaginal bleeding    HPI: 33yo  @ 15w3d by LMP 22 with KALIN 22 with vaginal bleeding for the past two hours. Patient states she was taking a shower and notice bright red blood with clots. She endorses 2/10 abdominal cramping that has since resolved. She was last seen in the ED on  for vaginal spotting which had resolved until today. She denies lightheadedness, dizziness, SOB or palpitations. She was treated for UTI in this pregnancy at 10 weeks with resolution of symptoms. Last saw Dr. Pearson yesterday. Denies fevers, chills, nausea, vomiting, diarrhea, constipation, dysuria or urinary frequency or urgency.    Last [place]: two months ago  Last BM: this morning    Ob/Gyn History:   - SAB no D&C                 LMP - 22                   Cycle Length - regular  Denies history of ovarian cysts, uterine fibroids, abnormal paps, or STIs    Home Medications: PNV    No Known Allergies    PAST MEDICAL & SURGICAL HISTORY: none    FAMILY HISTORY: no pertinent hx      SOCIAL HISTORY: Denies cigarette use, alcohol use, or illicit drug use    Vital Signs Last 24 Hrs  T(F): 98.7 (25 May 2022 20:08), Max: 98.7 (25 May 2022 20:08)  HR: 117 (25 May 2022 20:08) (117 - 117)   BP: 147/73 (25 May 2022 20:08) (147/73 - 147/73)  RR: 20 (25 May 2022 20:08) (20 - 20)  Height (cm): 162.6 (22 @ 20:08)  Weight (kg): 74.8 (22 @ 20:08)  BMI (kg/m2): 28.3 (22 @ 20:08)  BSA (m2): 1.8 (22 @ 20:08)    General Appearance - AAOx3, NAD  Heart - S1S2 regular rate and rhythm  Lung - CTA Bilaterally  Abdomen - Soft, nontender, nondistended, no rebound, no rigidity, no guarding, bowel sounds present  External genitalia: normal female   Vaginal mucosa: rugaeted, 10 cc dark red clots  Cervix: appears closed. no active bleeding  Uterus: 10 week sized, mobile, nontender, no masses  Adnexa: no masses, no tenderness  BSS: , +fetal movement, posterior placenta    LABS:    HCG Quantitative, Serum: 82738.0 mIU/mL (22 @ 23:40)    Culture - Urine (collected 22 @ 01:50)  Source: Clean Catch Clean Catch (Midstream)  Final Report (22 @ 14:20):    >=3 organisms. Probable collection contamination.    RADIOLOGY & ADDITIONAL STUDIES:  pending PGY 1 Note:    Chief Complaint: vaginal bleeding    HPI: 33yo  @ 15w3d by LMP 22 with KALIN 22 with vaginal bleeding for the past two hours. Patient states she was taking a shower and notice bright red blood with clots. She endorses 2/10 abdominal cramping that has since resolved. She was last seen in the ED on  for vaginal spotting which had resolved until today. She denies lightheadedness, dizziness, SOB or palpitations. She was treated for UTI in this pregnancy at 10 weeks with resolution of symptoms. Last saw Dr. Pearson yesterday. Denies fevers, chills, nausea, vomiting, diarrhea, constipation, dysuria or urinary frequency or urgency.    Last [place]: two months ago  Last BM: this morning    Ob/Gyn History:   - SAB no D&C                 LMP - 22                   Cycle Length - regular  Denies history of ovarian cysts, uterine fibroids, abnormal paps, or STIs    Home Medications: PNV    No Known Allergies    PAST MEDICAL & SURGICAL HISTORY: none    FAMILY HISTORY: no pertinent hx      SOCIAL HISTORY: Denies cigarette use, alcohol use, or illicit drug use    Vital Signs Last 24 Hrs  T(C): 36.7 (25 May 2022 22:56), Max: 37.1 (25 May 2022 20:08)  T(F): 98.1 (25 May 2022 22:56), Max: 98.7 (25 May 2022 20:08)  HR: 91 (25 May 2022 22:56) (91 - 117)  BP: 110/76 (25 May 2022 22:56) (110/76 - 147/73)  BP(mean): --  RR: 20 (25 May 2022 22:56) (20 - 20)  SpO2: 100% (25 May 2022 22:56) (100% - 100%)    General Appearance - AAOx3, NAD  Heart - S1S2 regular rate and rhythm  Lung - CTA Bilaterally  Abdomen - Soft, nontender, nondistended, no rebound, no rigidity, no guarding, bowel sounds present  External genitalia: normal female   Vaginal mucosa: rugaeted, 10 cc dark red clots  Cervix: appears closed. no active bleeding  Uterus: 10 week sized, mobile, nontender, no masses  Adnexa: no masses, no tenderness  BSS: , +fetal movement, posterior placenta    LABS:    HCG Quantitative, Serum: 16218.0 mIU/mL (22 @ 23:40)    Culture - Urine (collected 22 @ 01:50)  Source: Clean Catch Clean Catch (Midstream)  Final Report (22 @ 14:20):    >=3 organisms. Probable collection contamination.    RADIOLOGY & ADDITIONAL STUDIES:  BSS: , +fetal movement, posterior placenta PGY 1 Note:    Chief Complaint: vaginal bleeding    HPI: 31yo  @ 15w3d by LMP 22 with KALIN 22 with vaginal bleeding for the past two hours. Patient states she was taking a shower and notice bright red blood with clots. She endorses 2/10 abdominal cramping that has since resolved. She was last seen in the ED on  for vaginal spotting which had resolved until today. She denies lightheadedness, dizziness, SOB or palpitations. She was treated for UTI in this pregnancy at 10 weeks with resolution of symptoms. Last saw Dr. Pearson yesterday. Denies fevers, chills, nausea, vomiting, diarrhea, constipation, dysuria or urinary frequency or urgency.    Last [place]: two months ago  Last BM: this morning    Ob/Gyn History:   - SAB no D&C                 LMP - 22                   Cycle Length - regular  Denies history of ovarian cysts, uterine fibroids, abnormal paps, or STIs    Home Medications: PNV    No Known Allergies    PAST MEDICAL & SURGICAL HISTORY: none    FAMILY HISTORY: no pertinent hx      SOCIAL HISTORY: Denies cigarette use, alcohol use, or illicit drug use    Vital Signs Last 24 Hrs  T(C): 36.7 (25 May 2022 22:56), Max: 37.1 (25 May 2022 20:08)  T(F): 98.1 (25 May 2022 22:56), Max: 98.7 (25 May 2022 20:08)  HR: 91 (25 May 2022 22:56) (91 - 117)  BP: 110/76 (25 May 2022 22:56) (110/76 - 147/73)  BP(mean): --  RR: 20 (25 May 2022 22:56) (20 - 20)  SpO2: 100% (25 May 2022 22:56) (100% - 100%)    General Appearance - AAOx3, NAD  Heart - S1S2 regular rate and rhythm  Lung - CTA Bilaterally  Abdomen - Soft, nontender, nondistended, no rebound, no rigidity, no guarding, bowel sounds present  External genitalia: normal female   Vaginal mucosa: rugaeted, 10 cc dark red clots  Cervix: appears closed. no active bleeding  Uterus: 10 week sized, mobile, nontender, no masses  Adnexa: no masses, no tenderness  BSS: , +fetal movement, posterior placenta    LABS:                11.2   13.36 )-----------( 442      ( 25 May 2022 21:15 )              33.2         136  |  106  |  9<L>  ----------------------------<  91  5.1<H>   |  19  |  0.6<L>    Ca    10.1      25 May 2022 21:15    TPro  8.2<H>  /  Alb  4.2  /  TBili  0.2  /  DBili  x   /  AST  45<H>  /  ALT  56<H>  /  AlkPhos  164<H>      HCG Quantitative, Serum: 35985.0 mIU/mL (22 @ 23:40)    Culture - Urine (collected 22 @ 01:50)  Source: Clean Catch Clean Catch (Midstream)  Final Report (22 @ 14:20):    >=3 organisms. Probable collection contamination.    RADIOLOGY & ADDITIONAL STUDIES:  < from: US OB Fetus Limited (22 @ 21:50) >  US OB LTD FETUS(ES)                          PROCEDURE DATE:  2022          INTERPRETATION:  CLINICAL HISTORY: Evaluate pregnancy.    COMPARISON: Pelvic ultrasound from 2022    LMP: 2022    Transabdominal pelvic sonogram only. Color Doppler was performed.    FINDINGS:    Uterus: There is a single intrauterine pregnancy with femur length   measuring 1.83 cm corresponding to 15 weeks, 3 days. Examination   performed for viability, not anatomy. Fetal heart rate measures 153 bpm.   The inferior aspect of the placenta is noted to extend to the lower   uterine segment. Large uterine fibroid is redemonstrated measuring 12.9 x   7.2 x 14.1 cm and smaller fibroid measuring 4.9 x 5.1 x 5.0 cm.    Right ovary: Not visualized.    Left ovary: 4.0 x 2.5 x 2.6 cm. Doppler flow demonstrated..    Fluid: None.    IMPRESSION:    Single intrauterine pregnancy corresponding to 15 weeks, 3 days by femur   length with heart rate of 153 bpm.  2 large uterine fibroids are noted, one of which appears to measure up to   14 cm.    --- End of Report ---

## 2022-05-26 RX ORDER — NITROFURANTOIN MACROCRYSTALS 100 MG/1
100 CAPSULE ORAL
Qty: 14 | Refills: 0 | Status: ACTIVE | COMMUNITY
Start: 2022-05-26 | End: 1900-01-01

## 2022-05-28 LAB
-  AMIKACIN: SIGNIFICANT CHANGE UP
-  AMPICILLIN/SULBACTAM: SIGNIFICANT CHANGE UP
-  AMPICILLIN: SIGNIFICANT CHANGE UP
-  CEFEPIME: SIGNIFICANT CHANGE UP
-  CEFTAZIDIME: SIGNIFICANT CHANGE UP
-  CEFTRIAXONE: SIGNIFICANT CHANGE UP
-  CIPROFLOXACIN: SIGNIFICANT CHANGE UP
-  CIPROFLOXACIN: SIGNIFICANT CHANGE UP
-  GENTAMICIN: SIGNIFICANT CHANGE UP
-  IMIPENEM: SIGNIFICANT CHANGE UP
-  LEVOFLOXACIN: SIGNIFICANT CHANGE UP
-  LEVOFLOXACIN: SIGNIFICANT CHANGE UP
-  MEROPENEM: SIGNIFICANT CHANGE UP
-  NITROFURANTOIN: SIGNIFICANT CHANGE UP
-  PIPERACILLIN/TAZOBACTAM: SIGNIFICANT CHANGE UP
-  TETRACYCLINE: SIGNIFICANT CHANGE UP
-  TOBRAMYCIN: SIGNIFICANT CHANGE UP
-  TRIMETHOPRIM/SULFAMETHOXAZOLE: SIGNIFICANT CHANGE UP
-  VANCOMYCIN: SIGNIFICANT CHANGE UP
CULTURE RESULTS: SIGNIFICANT CHANGE UP
METHOD TYPE: SIGNIFICANT CHANGE UP
ORGANISM # SPEC MICROSCOPIC CNT: SIGNIFICANT CHANGE UP
SPECIMEN SOURCE: SIGNIFICANT CHANGE UP

## 2022-06-07 ENCOUNTER — APPOINTMENT (OUTPATIENT)
Dept: OBGYN | Facility: CLINIC | Age: 32
End: 2022-06-07
Payer: COMMERCIAL

## 2022-06-07 ENCOUNTER — OUTPATIENT (OUTPATIENT)
Dept: OUTPATIENT SERVICES | Facility: HOSPITAL | Age: 32
LOS: 1 days | Discharge: HOME | End: 2022-06-07

## 2022-06-07 ENCOUNTER — NON-APPOINTMENT (OUTPATIENT)
Age: 32
End: 2022-06-07

## 2022-06-07 VITALS
BODY MASS INDEX: 29.06 KG/M2 | TEMPERATURE: 98 F | DIASTOLIC BLOOD PRESSURE: 72 MMHG | HEIGHT: 63 IN | WEIGHT: 164 LBS | HEART RATE: 101 BPM | SYSTOLIC BLOOD PRESSURE: 114 MMHG

## 2022-06-07 VITALS — SYSTOLIC BLOOD PRESSURE: 138 MMHG | HEART RATE: 111 BPM | DIASTOLIC BLOOD PRESSURE: 75 MMHG

## 2022-06-07 VITALS
RESPIRATION RATE: 18 BRPM | HEART RATE: 111 BPM | TEMPERATURE: 100 F | DIASTOLIC BLOOD PRESSURE: 75 MMHG | SYSTOLIC BLOOD PRESSURE: 138 MMHG

## 2022-06-07 DIAGNOSIS — O26.892 OTHER SPECIFIED PREGNANCY RELATED CONDITIONS, SECOND TRIMESTER: ICD-10-CM

## 2022-06-07 DIAGNOSIS — N89.8 OTHER SPECIFIED NONINFLAMMATORY DISORDERS OF VAGINA: ICD-10-CM

## 2022-06-07 DIAGNOSIS — Z3A.17 17 WEEKS GESTATION OF PREGNANCY: ICD-10-CM

## 2022-06-07 LAB
APPEARANCE UR: CLEAR — SIGNIFICANT CHANGE UP
BACTERIA # UR AUTO: NEGATIVE — SIGNIFICANT CHANGE UP
BASOPHILS # BLD AUTO: 0.04 K/UL — SIGNIFICANT CHANGE UP (ref 0–0.2)
BASOPHILS NFR BLD AUTO: 0.2 % — SIGNIFICANT CHANGE UP (ref 0–1)
BILIRUB UR QL STRIP: NORMAL
BILIRUB UR-MCNC: NEGATIVE — SIGNIFICANT CHANGE UP
CLARITY UR: CLEAR
COLLECTION METHOD: NORMAL
COLOR SPEC: SIGNIFICANT CHANGE UP
DIFF PNL FLD: ABNORMAL
EOSINOPHIL # BLD AUTO: 0 K/UL — SIGNIFICANT CHANGE UP (ref 0–0.7)
EOSINOPHIL NFR BLD AUTO: 0 % — SIGNIFICANT CHANGE UP (ref 0–8)
EPI CELLS # UR: 1 /HPF — SIGNIFICANT CHANGE UP (ref 0–5)
GLUCOSE UR QL: NEGATIVE — SIGNIFICANT CHANGE UP
GLUCOSE UR-MCNC: NORMAL
HCG UR QL: 0.2 EU/DL
HCT VFR BLD CALC: 29.9 % — LOW (ref 37–47)
HGB BLD-MCNC: 10.1 G/DL — LOW (ref 12–16)
HGB UR QL STRIP.AUTO: ABNORMAL
HYALINE CASTS # UR AUTO: 1 /LPF — SIGNIFICANT CHANGE UP (ref 0–7)
IMM GRANULOCYTES NFR BLD AUTO: 2 % — HIGH (ref 0.1–0.3)
KETONES UR-MCNC: NORMAL
KETONES UR-MCNC: SIGNIFICANT CHANGE UP
LEUKOCYTE ESTERASE UR QL STRIP: ABNORMAL
LEUKOCYTE ESTERASE UR-ACNC: ABNORMAL
LYMPHOCYTES # BLD AUTO: 1.93 K/UL — SIGNIFICANT CHANGE UP (ref 1.2–3.4)
LYMPHOCYTES # BLD AUTO: 10.6 % — LOW (ref 20.5–51.1)
MCHC RBC-ENTMCNC: 32 PG — HIGH (ref 27–31)
MCHC RBC-ENTMCNC: 33.8 G/DL — SIGNIFICANT CHANGE UP (ref 32–37)
MCV RBC AUTO: 94.6 FL — SIGNIFICANT CHANGE UP (ref 81–99)
MONOCYTES # BLD AUTO: 1 K/UL — HIGH (ref 0.1–0.6)
MONOCYTES NFR BLD AUTO: 5.5 % — SIGNIFICANT CHANGE UP (ref 1.7–9.3)
NEUTROPHILS # BLD AUTO: 14.95 K/UL — HIGH (ref 1.4–6.5)
NEUTROPHILS NFR BLD AUTO: 81.7 % — HIGH (ref 42.2–75.2)
NITRITE UR QL STRIP: NORMAL
NITRITE UR-MCNC: NEGATIVE — SIGNIFICANT CHANGE UP
NRBC # BLD: 0 /100 WBCS — SIGNIFICANT CHANGE UP (ref 0–0)
PH UR STRIP: 5.5
PH UR: 5.5 — SIGNIFICANT CHANGE UP (ref 5–8)
PLATELET # BLD AUTO: 425 K/UL — HIGH (ref 130–400)
PROT UR STRIP-MCNC: NORMAL
PROT UR-MCNC: NEGATIVE — SIGNIFICANT CHANGE UP
RBC # BLD: 3.16 M/UL — LOW (ref 4.2–5.4)
RBC # FLD: 13.5 % — SIGNIFICANT CHANGE UP (ref 11.5–14.5)
RBC CASTS # UR COMP ASSIST: 1 /HPF — SIGNIFICANT CHANGE UP (ref 0–4)
SP GR SPEC: 1.01 — SIGNIFICANT CHANGE UP (ref 1.01–1.03)
SP GR UR STRIP: 1
UROBILINOGEN FLD QL: SIGNIFICANT CHANGE UP
WBC # BLD: 18.28 K/UL — HIGH (ref 4.8–10.8)
WBC # FLD AUTO: 18.28 K/UL — HIGH (ref 4.8–10.8)
WBC UR QL: 5 /HPF — SIGNIFICANT CHANGE UP (ref 0–5)

## 2022-06-07 PROCEDURE — 0502F SUBSEQUENT PRENATAL CARE: CPT

## 2022-06-07 PROCEDURE — 81003 URINALYSIS AUTO W/O SCOPE: CPT | Mod: QW

## 2022-06-07 NOTE — OB PROVIDER TRIAGE NOTE - HISTORY OF PRESENT ILLNESS
31y/o  at 17w2d dated by LMP confirmed by first trimester sonogram with KALIN 2022, presents from the clinic for rule out rupture. She states about a week ago she thought she had some leakage of fluid, but didnt follow up with anyone about it. She states today she was in the office and Dr. Pearson was concerned about her fluid levels. She denies abdominal cramping or contractions. Pregnancy has been complicated by vaginal spotting since 13 weeks. She has known myomas. Otherwise pregnancy is uncomplicated. GBS unknown. Denies fevers, chills, SOB, CP, abdominal pain, nausea, vomiting, diarrhea, constipation, dysuria or urinary urgency.    Last [place]:  Last BM:  Last PO: 33y/o  at 17w2d dated by LMP confirmed by first trimester sonogram with KALIN 2022, presents from the clinic for rule out rupture. She states about a week ago she thought she had some leakage of fluid, but didnt follow up with anyone about it. She states today she was in the office and Dr. Pearson was concerned about her fluid levels. She denies abdominal cramping or contractions. Pregnancy has been complicated by vaginal spotting since 13 weeks. She has known uterine fibroids subserosal 4.62cm and left lateral 12.6cm pedunculated fibroid on most recent scan. Otherwise pregnancy is uncomplicated. GBS unknown. Denies fevers, chills, SOB, CP, abdominal pain, nausea, vomiting, diarrhea, constipation, dysuria or urinary urgency.    Last [place]: months ago  Last BM: today (regular per patient)  Last PO: 3pm 33y/o  at 17w2d dated by LMP confirmed by first trimester sonogram with KALIN 2022, presents from the clinic for rule out rupture. She states about a week ago she thought she had some leakage of fluid, but didnt follow up with anyone about it. She states today she was in the office and Dr. Pearson was concerned about her fluid levels. She denies abdominal cramping or contractions. Pregnancy has been complicated by vaginal spotting since 13 weeks. She has known uterine fibroids subserosal 4.62cm and left lateral 12.6cm pedunculated fibroid on most recent scan. Otherwise pregnancy is uncomplicated. GBS unknown. Denies fevers, chills, SOB, CP, abdominal pain, nausea, vomiting, diarrhea, constipation, dysuria, vaginal itching or urinary urgency.    Last [place]: months ago  Last BM: today (regular per patient)  Last PO: 3pm 33y/o  at 17w2d dated by LMP confirmed by first trimester sonogram with KALIN 2022, presents from the clinic for rule out rupture. She states about a week ago she thought she had some leakage of fluid, but didnt follow up with anyone about it. She states today she was in the office and Dr. Pearson was concerned about her fluid levels. She denies abdominal cramping or contractions. Pregnancy has been complicated by vaginal spotting since 13 weeks. Previously treated for UTI on  with resolution of symptoms She has known uterine fibroids subserosal 4.62cm and left lateral 12.6cm pedunculated fibroid on most recent scan. Otherwise pregnancy is uncomplicated. GBS unknown. Denies fevers, chills, SOB, CP, abdominal pain, nausea, vomiting, diarrhea, constipation, dysuria, vaginal itching or urinary urgency.    Last [place]: months ago  Last BM: today (regular per patient)  Last PO: 3pm 31y/o  at 17w2d dated by LMP confirmed by first trimester sonogram with KALIN 2022, presents from the clinic for rule out rupture. She states about a week ago she thought she had some leakage of fluid, but didnt follow up with anyone about it. She states today she was in the office and Dr. Pearson was concerned about her fluid levels. She denies abdominal cramping or contractions. Pregnancy has been complicated by vaginal spotting since 13 weeks. Previously treated for UTI on  with resolution of symptoms She has known uterine fibroids subserosal 4.62cm and left lateral 12.6cm pedunculated fibroid on most recent scan. Otherwise pregnancy is uncomplicated. GBS unknown. Denies fevers, chills, SOB, CP, abdominal pain, nausea, vomiting, diarrhea, constipation, dysuria, vaginal itching or urinary urgency.    Last [place]: months ago  Last BM: today (regular per patient)  Last PO: 3pm

## 2022-06-07 NOTE — OB PROVIDER TRIAGE NOTE - NSHPPHYSICALEXAM_GEN_ALL_CORE
Vital Signs Last 24 Hrs  T(C): 37.6 (07 Jun 2022 18:58), Max: 37.6 (07 Jun 2022 18:58)  T(F): 99.6 (07 Jun 2022 18:58), Max: 99.6 (07 Jun 2022 18:58)  HR: 111 (07 Jun 2022 18:58) (111 - 111), bedside 90bpm  BP: 138/75 (07 Jun 2022 18:58) (138/75 - 138/75)  RR: 18 (07 Jun 2022 18:58) (18 - 18)    Gen: AOx3, no acute distress  Abd: soft, gravid, non tender, no palpable contractions  Ext: No edema bilaterally  Speculum: Dark brown discharge noted, closed cervix, no pooling, nitrizine not done, ferning neg  BSS: CL 4.3, MVP 3.5, +FM,       [place]: none  SVE: 0/0/-3   vertex intact Vital Signs Last 24 Hrs  T(C): 37.6 (07 Jun 2022 18:58), Max: 37.6 (07 Jun 2022 18:58)  T(F): 99.6 (07 Jun 2022 18:58), Max: 99.6 (07 Jun 2022 18:58)  HR: 111 (07 Jun 2022 18:58) (111 - 111), bedside 90bpm  BP: 138/75 (07 Jun 2022 18:58) (138/75 - 138/75)  RR: 18 (07 Jun 2022 18:58) (18 - 18)    Gen: AOx3, no acute distress  Heart: s1, s2 no murmurs rubs or gallops  Abd: soft, gravid, non tender, NO fundal tenderness, no palpable contractions  Ext: No edema bilaterally  Speculum: Dark brown discharge noted, closed cervix, no pooling, nitrazine not done, ferning neg, no lesions  BSS: CL 4.06, MVP 3.5, +FM, , posterior placenta      [place]: none  SVE: 0/0/-3   vertex intact Vital Signs Last 24 Hrs  T(C): 37.6 (07 Jun 2022 18:58), Max: 37.6 (07 Jun 2022 18:58)  T(F): 99.6 (07 Jun 2022 18:58), Max: 99.6 (07 Jun 2022 18:58)  HR: 111 (07 Jun 2022 18:58) (111 - 111), bedside 90bpm  BP: 138/75 (07 Jun 2022 18:58) (138/75 - 138/75)  RR: 18 (07 Jun 2022 18:58) (18 - 18)    Gen: AOx3, no acute distress  Heart: s1, s2 no murmurs rubs or gallops  Abd: soft, gravid, non tender, NO fundal tenderness, no palpable contractions, no CVA tenderness, no suprapubic tendenress  Ext: No edema bilaterally  Speculum: Dark brown discharge noted, closed cervix, no pooling, nitrazine not done, ferning neg, no lesions  BSS: CL 4.06, MVP 3.5, +FM, , posterior placenta      [place]: none  SVE: 0/0/-3   vertex intact

## 2022-06-07 NOTE — OB RN TRIAGE NOTE - FALL HARM RISK - UNIVERSAL INTERVENTIONS
Bed in lowest position, wheels locked, appropriate side rails in place/Call bell, personal items and telephone in reach/Instruct patient to call for assistance before getting out of bed or chair/Non-slip footwear when patient is out of bed/[place] to call system/Physically safe environment - no spills, clutter or unnecessary equipment/Purposeful Proactive Rounding/Room/bathroom lighting operational, light cord in reach

## 2022-06-07 NOTE — OB PROVIDER TRIAGE NOTE - NSHPLABSRESULTS_GEN_ALL_CORE
SONOS:  10w3d: posterior placenta, CL 3.48, fundal myoma 3.28  13w3d: NT 1.34, posterior placenta, anterior myoma subserosal, 4.62, left lateral 12.6cm pedunculated    4/20/22  hep B: NR  RPR NR  rubella: immune  measles: immune  HIV NR  B+ SONOS:  10w3d: posterior placenta, CL 3.48, fundal myoma 3.28  13w3d: NT 1.34, posterior placenta, anterior myoma subserosal, 4.62, left lateral 12.6cm pedunculated    4/20/22  hep B: NR  RPR NR  rubella: immune  measles: immune  HIV NR  varicella nonimmune  B+

## 2022-06-07 NOTE — OB PROVIDER TRIAGE NOTE - NSOBPROVIDERNOTE_OBGYN_ALL_OB_FT
32y GP @ weeks, GBS , not in labor.  -Discharged Home  -Labor precautions reviewed  -PO Hydration encouraged  -Reviewed fetal kick counts  -Follow up with scheduled OB visit  -Patient verbalized understanding 31yo  @ 17w2ds, GBS unknown, not ruptured, not in  labor, reassuring fetal and maternal status.    -Discharged Home  - precautions reviewed  -PO Hydration encouraged  -Reviewed fetal kick counts  -Follow up with Dr. Pearson on  and we will make an appointment for MFM appointment tomorrow   -Patient verbalized understanding    Dr. Garcia and Dr. Pearson aware. 31yo  @ 17w2d, GBS unknown, not ruptured, not in  labor, reassuring fetal and maternal status.    -Discharged Home  - precautions reviewed  -PO Hydration encouraged  -Reviewed fetal kick counts  -Follow up with Dr. Pearson on  and we will make an appointment for MFM appointment tomorrow   -Patient verbalized understanding    Dr. Garcia and Dr. Pearson aware.    **ADDENDUM**                        10.1   18.28 )-----------( 425      ( 2022 21:00 )             29.9   Urinalysis Basic - ( 2022 22:06 )    Color: Light Yellow / Appearance: Clear / S.009 / pH: x  Gluc: x / Ketone: Trace  / Bili: Negative / Urobili: <2 mg/dL   Blood: x / Protein: Negative / Nitrite: Negative   Leuk Esterase: Large / RBC: 1 /HPF / WBC 5 /HPF   Sq Epi: x / Non Sq Epi: 1 /HPF / Bacteria: Negative      Patient evaluated at bedside and continues to deny any complaints. Will see MFM tomorrow AM.  Bleeding and infection precautions discussed.  F/u urine culture and vaginitis.    Dr. Pearson aware.

## 2022-06-08 PROBLEM — D25.9 LEIOMYOMA OF UTERUS, UNSPECIFIED: Chronic | Status: ACTIVE | Noted: 2022-05-27

## 2022-06-09 LAB
CULTURE RESULTS: SIGNIFICANT CHANGE UP
SPECIMEN SOURCE: SIGNIFICANT CHANGE UP

## 2022-06-15 ENCOUNTER — RESULT REVIEW (OUTPATIENT)
Age: 32
End: 2022-06-15

## 2022-06-15 ENCOUNTER — INPATIENT (INPATIENT)
Facility: HOSPITAL | Age: 32
LOS: 0 days | Discharge: HOME | End: 2022-06-16
Payer: COMMERCIAL

## 2022-06-15 ENCOUNTER — OUTPATIENT (OUTPATIENT)
Dept: OUTPATIENT SERVICES | Facility: HOSPITAL | Age: 32
LOS: 1 days | Discharge: HOME | End: 2022-06-15

## 2022-06-15 ENCOUNTER — NON-APPOINTMENT (OUTPATIENT)
Age: 32
End: 2022-06-15

## 2022-06-15 ENCOUNTER — TRANSCRIPTION ENCOUNTER (OUTPATIENT)
Age: 32
End: 2022-06-15

## 2022-06-15 ENCOUNTER — ASOB RESULT (OUTPATIENT)
Age: 32
End: 2022-06-15

## 2022-06-15 ENCOUNTER — APPOINTMENT (OUTPATIENT)
Dept: ANTEPARTUM | Facility: CLINIC | Age: 32
End: 2022-06-15
Payer: COMMERCIAL

## 2022-06-15 VITALS — SYSTOLIC BLOOD PRESSURE: 124 MMHG | HEART RATE: 113 BPM | DIASTOLIC BLOOD PRESSURE: 83 MMHG

## 2022-06-15 LAB
APPEARANCE UR: ABNORMAL
BACTERIA # UR AUTO: ABNORMAL
BASOPHILS # BLD AUTO: 0.04 K/UL — SIGNIFICANT CHANGE UP (ref 0–0.2)
BASOPHILS NFR BLD AUTO: 0.3 % — SIGNIFICANT CHANGE UP (ref 0–1)
BILIRUB UR-MCNC: NEGATIVE — SIGNIFICANT CHANGE UP
BLD GP AB SCN SERPL QL: SIGNIFICANT CHANGE UP
COLOR SPEC: SIGNIFICANT CHANGE UP
DIFF PNL FLD: ABNORMAL
EOSINOPHIL # BLD AUTO: 0 K/UL — SIGNIFICANT CHANGE UP (ref 0–0.7)
EOSINOPHIL NFR BLD AUTO: 0 % — SIGNIFICANT CHANGE UP (ref 0–8)
EPI CELLS # UR: 3 /HPF — SIGNIFICANT CHANGE UP (ref 0–5)
GLUCOSE UR QL: NEGATIVE — SIGNIFICANT CHANGE UP
HCT VFR BLD CALC: 30.4 % — LOW (ref 37–47)
HGB BLD-MCNC: 10.3 G/DL — LOW (ref 12–16)
HYALINE CASTS # UR AUTO: 4 /LPF — SIGNIFICANT CHANGE UP (ref 0–7)
IMM GRANULOCYTES NFR BLD AUTO: 1.6 % — HIGH (ref 0.1–0.3)
KETONES UR-MCNC: NEGATIVE — SIGNIFICANT CHANGE UP
LEUKOCYTE ESTERASE UR-ACNC: ABNORMAL
LYMPHOCYTES # BLD AUTO: 1.65 K/UL — SIGNIFICANT CHANGE UP (ref 1.2–3.4)
LYMPHOCYTES # BLD AUTO: 10.8 % — LOW (ref 20.5–51.1)
MCHC RBC-ENTMCNC: 32 PG — HIGH (ref 27–31)
MCHC RBC-ENTMCNC: 33.9 G/DL — SIGNIFICANT CHANGE UP (ref 32–37)
MCV RBC AUTO: 94.4 FL — SIGNIFICANT CHANGE UP (ref 81–99)
MONOCYTES # BLD AUTO: 0.83 K/UL — HIGH (ref 0.1–0.6)
MONOCYTES NFR BLD AUTO: 5.4 % — SIGNIFICANT CHANGE UP (ref 1.7–9.3)
NEUTROPHILS # BLD AUTO: 12.55 K/UL — HIGH (ref 1.4–6.5)
NEUTROPHILS NFR BLD AUTO: 81.9 % — HIGH (ref 42.2–75.2)
NITRITE UR-MCNC: NEGATIVE — SIGNIFICANT CHANGE UP
NRBC # BLD: 0 /100 WBCS — SIGNIFICANT CHANGE UP (ref 0–0)
PH UR: 6.5 — SIGNIFICANT CHANGE UP (ref 5–8)
PLATELET # BLD AUTO: 376 K/UL — SIGNIFICANT CHANGE UP (ref 130–400)
PROT UR-MCNC: SIGNIFICANT CHANGE UP
RBC # BLD: 3.22 M/UL — LOW (ref 4.2–5.4)
RBC # FLD: 13.8 % — SIGNIFICANT CHANGE UP (ref 11.5–14.5)
RBC CASTS # UR COMP ASSIST: 2 /HPF — SIGNIFICANT CHANGE UP (ref 0–4)
SARS-COV-2 RNA SPEC QL NAA+PROBE: SIGNIFICANT CHANGE UP
SP GR SPEC: 1 — LOW (ref 1.01–1.03)
UROBILINOGEN FLD QL: SIGNIFICANT CHANGE UP
WBC # BLD: 15.31 K/UL — HIGH (ref 4.8–10.8)
WBC # FLD AUTO: 15.31 K/UL — HIGH (ref 4.8–10.8)
WBC UR QL: 239 /HPF — HIGH (ref 0–5)

## 2022-06-15 PROCEDURE — 99213 OFFICE O/P EST LOW 20 MIN: CPT | Mod: 25

## 2022-06-15 PROCEDURE — 76816 OB US FOLLOW-UP PER FETUS: CPT | Mod: 26

## 2022-06-15 RX ORDER — OXYTOCIN 10 UNIT/ML
333.33 VIAL (ML) INJECTION
Qty: 20 | Refills: 0 | Status: DISCONTINUED | OUTPATIENT
Start: 2022-06-15 | End: 2022-06-16

## 2022-06-15 RX ORDER — SODIUM CHLORIDE 9 MG/ML
500 INJECTION, SOLUTION INTRAVENOUS
Refills: 0 | Status: DISCONTINUED | OUTPATIENT
Start: 2022-06-15 | End: 2022-06-16

## 2022-06-15 RX ORDER — MORPHINE SULFATE 50 MG/1
2 CAPSULE, EXTENDED RELEASE ORAL ONCE
Refills: 0 | Status: DISCONTINUED | OUTPATIENT
Start: 2022-06-15 | End: 2022-06-15

## 2022-06-15 RX ORDER — BUTORPHANOL TARTRATE 2 MG/ML
2 INJECTION, SOLUTION INTRAMUSCULAR; INTRAVENOUS ONCE
Refills: 0 | Status: DISCONTINUED | OUTPATIENT
Start: 2022-06-15 | End: 2022-06-15

## 2022-06-15 RX ORDER — INFLUENZA VIRUS VACCINE 15; 15; 15; 15 UG/.5ML; UG/.5ML; UG/.5ML; UG/.5ML
0.5 SUSPENSION INTRAMUSCULAR ONCE
Refills: 0 | Status: DISCONTINUED | OUTPATIENT
Start: 2022-06-15 | End: 2022-06-16

## 2022-06-15 RX ORDER — SODIUM CHLORIDE 9 MG/ML
1000 INJECTION, SOLUTION INTRAVENOUS
Refills: 0 | Status: DISCONTINUED | OUTPATIENT
Start: 2022-06-15 | End: 2022-06-16

## 2022-06-15 RX ORDER — DIPHENHYDRAMINE HCL 50 MG
25 CAPSULE ORAL ONCE
Refills: 0 | Status: COMPLETED | OUTPATIENT
Start: 2022-06-15 | End: 2022-06-15

## 2022-06-15 RX ADMIN — MORPHINE SULFATE 2 MILLIGRAM(S): 50 CAPSULE, EXTENDED RELEASE ORAL at 22:59

## 2022-06-15 RX ADMIN — Medication 25 MILLIGRAM(S): at 19:29

## 2022-06-15 RX ADMIN — BUTORPHANOL TARTRATE 2 MILLIGRAM(S): 2 INJECTION, SOLUTION INTRAMUSCULAR; INTRAVENOUS at 19:29

## 2022-06-15 RX ADMIN — SODIUM CHLORIDE 125 MILLILITER(S): 9 INJECTION, SOLUTION INTRAVENOUS at 12:40

## 2022-06-15 RX ADMIN — BUTORPHANOL TARTRATE 2 MILLIGRAM(S): 2 INJECTION, SOLUTION INTRAMUSCULAR; INTRAVENOUS at 20:00

## 2022-06-15 NOTE — OB PROVIDER H&P - NSHPLABSRESULTS_GEN_ALL_CORE
SONOS:  10w3d: posterior placenta, CL 3.48, fundal myoma 3.28  13w3d: NT 1.34, posterior placenta, anterior myoma subserosal, 4.62, left lateral 12.6cm pedunculated    4/20/22  hep B: NR  RPR NR  rubella: immune  measles: immune  HIV NR  varicella nonimmune  B+ SONOS:  10w3d: posterior placenta, CL 3.48, fundal myoma 3.28  13w3d: NT 1.34, posterior placenta, anterior myoma subserosal, 4.62, left lateral 12.6cm pedunculated    4/20/22  hep B: NR  RPR NR  rubella: immune  measles: immune  HIV NR  varicella nonimmune  B+    SONOS:  13w3d: NT 1.34mm, myoma: anterior 4.62, left lateral 12.6,  posterior placenta

## 2022-06-15 NOTE — OB PROVIDER H&P - ASSESSMENT
31yo  @ 18w3ds, with known uterine fibroids, no amniotic fluid noted, for induced termination of pregnancy.    Admit to L & D  IV hydration, labs  Continuous TOCO monitoring  Clear Liquid diet  Pain Management PRN  IOL w/ Cytotec 400 q3h    Dr. Figueroa and Dr. West aware. 33yo  @ 18w3ds, with known uterine fibroids and anhydramnios for induced termination of pregnancy.    Admit to L & D  IV hydration, labs  Continuous TOCO monitoring  Clear Liquid diet  Pain Management PRN  IOL w/ Cytotec 400 q3h    Dr. Figueroa and Dr. West aware.

## 2022-06-15 NOTE — OB RN TRIAGE NOTE - FALL HARM RISK - UNIVERSAL INTERVENTIONS
Bed in lowest position, wheels locked, appropriate side rails in place/Call bell, personal items and telephone in reach/Instruct patient to call for assistance before getting out of bed or chair/Non-slip footwear when patient is out of bed/[place] to call system/Physically safe environment - no spills, clutter or unnecessary equipment/Purposeful Proactive Rounding/Room/bathroom lighting operational, light cord in reach

## 2022-06-15 NOTE — OB RN PATIENT PROFILE - FALL HARM RISK - UNIVERSAL INTERVENTIONS
Bed in lowest position, wheels locked, appropriate side rails in place/Call bell, personal items and telephone in reach/Instruct patient to call for assistance before getting out of bed or chair/Non-slip footwear when patient is out of bed/[place] to call system/Physically safe environment - no spills, clutter or unnecessary equipment/Purposeful Proactive Rounding/Room/bathroom lighting operational, light cord in reach

## 2022-06-15 NOTE — PROGRESS NOTE ADULT - ASSESSMENT
A/P:   33yo  at 18w3d, with known uterine fibroids and anhydramnios for induced termination of pregnancy.  - cytotec 400mcg #2 placed, next dose at 1915  -pain management prn  -cont toco  -f/u pending labs- Ucx, GC/CT  -cont to monitor vitals  -cont iv hydration    Dr. Pearson aware  
A/P: 33yo  at 18w3d, with known uterine fibroids and anhydramnios for induced termination of pregnancy.  - cytotec 400mcg #3 placed, next dose at 0  -pain management prn  -cont toco  -f/u pending labs- Ucx, GC/CT  -cont to monitor vitals  -cont iv hydration    Dr. Pearson aware  
33yo  at 18w3d, with known uterine fibroids and anhydramnios, for induced termination of pregnancy.  -cytotec 400mcg #4 placed, next dose in 3 hours  -pain management prn, s/p dose of stadol/benadryl, patient would like to try a dose of morphine prior to consideration of epidural  -cont toco  -f/u pending labs- Ucx, GC/CT  -cont to monitor vitals  -cont iv hydration    Dr. Pearson and Dr. Garcia aware

## 2022-06-15 NOTE — OB PROVIDER H&P - NSHPPHYSICALEXAM_GEN_ALL_CORE
Vital Signs Last 24 Hrs  T(C): 36.8 (15 Waqar 2022 11:55), Max: 36.8 (15 Waqar 2022 11:55)  T(F): 98.2 (15 Waqar 2022 11:55), Max: 98.2 (15 Waqar 2022 11:55)  HR: 113 (15 Waqar 2022 11:55) (113 - 113)  BP: 124/83 (15 Waqar 2022 11:55) (124/83 - 124/83)  RR: 18 (15 Waqar 2022 11:55) (18 - 18)    Gen: AOx3, no acute distress  Heart: s1, s2 no murmurs rubs of gallops  Lungs: CTAB  Abd: soft, gravid, non tender, no palpable contractions, no fundal tenderness, no CVA tenderness  Ext: No edema bilaterally  Speculum: neg nitrizine, neg ferning, no pooling, +yellow/green discharge  BSS: vertex, no 2/2 pocket noted, fundal placenta    EFM:  +FHR  [place]: none palpated  SVE: 0/0/-3   vertex intact @ Vital Signs Last 24 Hrs  T(C): 36.8 (15 Waqar 2022 11:55), Max: 36.8 (15 Waqar 2022 11:55)  T(F): 98.2 (15 Waqar 2022 11:55), Max: 98.2 (15 Waqar 2022 11:55)  HR: 113 (15 Waqar 2022 11:55) (113 - 113)  BP: 124/83 (15 Waqar 2022 11:55) (124/83 - 124/83)  RR: 18 (15 Waqar 2022 11:55) (18 - 18)    Gen: AOx3, no acute distress  Heart: s1, s2 no murmurs rubs of gallops  Lungs: CTAB  Abd: soft, gravid, non tender, no palpable contractions, no fundal tenderness, no CVA tenderness  Ext: No edema bilaterally  Speculum: neg Nitrazine, neg ferning, no pooling, +yellow/green discharge  BSS: vertex, no 2/2 pocket noted, fundal placenta    EFM:  +FHR  [place]: none palpated  SVE: 0/0/-3   vertex intact @

## 2022-06-15 NOTE — OB PROVIDER H&P - HISTORY OF PRESENT ILLNESS
31y/o  at 18w3d dated by LMP confirmed by first trimester sonogram with KALIN 2022, presents from the clinic for rule out rupture after concern about fluid level around the baby. She states about two weeks ago she thought she had some leakage of fluid. She was seen on labor and delivery on 22 and was not found to be ruptured. She denies abdominal cramping or contractions. Pregnancy has been complicated by vaginal spotting since 13 weeks. Previously treated for UTI on  with resolution of symptoms She has known uterine fibroids subserosal 4.62cm and left lateral 12.6cm pedunculated fibroid on most recent scan. Otherwise pregnancy is uncomplicated. GBS unknown. Denies fevers, chills, SOB, CP, abdominal pain, nausea, vomiting, diarrhea, constipation, dysuria, vaginal itching or urinary urgency.    Last [place]: months ago  Last BM: today (regular per patient)  Last PO: 3pm

## 2022-06-16 ENCOUNTER — APPOINTMENT (OUTPATIENT)
Dept: ANTEPARTUM | Facility: CLINIC | Age: 32
End: 2022-06-16

## 2022-06-16 VITALS — SYSTOLIC BLOOD PRESSURE: 130 MMHG | HEART RATE: 114 BPM | DIASTOLIC BLOOD PRESSURE: 63 MMHG

## 2022-06-16 DIAGNOSIS — Z02.9 ENCOUNTER FOR ADMINISTRATIVE EXAMINATIONS, UNSPECIFIED: ICD-10-CM

## 2022-06-16 LAB
A VAGINAE DNA VAG QL NAA+PROBE: SIGNIFICANT CHANGE UP
BASOPHILS # BLD AUTO: 0.03 K/UL — SIGNIFICANT CHANGE UP (ref 0–0.2)
BASOPHILS # BLD AUTO: 0.03 K/UL — SIGNIFICANT CHANGE UP (ref 0–0.2)
BASOPHILS NFR BLD AUTO: 0.1 % — SIGNIFICANT CHANGE UP (ref 0–1)
BASOPHILS NFR BLD AUTO: 0.2 % — SIGNIFICANT CHANGE UP (ref 0–1)
BVAB2 DNA VAG QL NAA+PROBE: SIGNIFICANT CHANGE UP
C ALBICANS DNA VAG QL NAA+PROBE: NEGATIVE — SIGNIFICANT CHANGE UP
C GLABRATA DNA VAG QL NAA+PROBE: NEGATIVE — SIGNIFICANT CHANGE UP
C KRUSEI DNA VAG QL NAA+PROBE: NEGATIVE — SIGNIFICANT CHANGE UP
C LUSITANIAE DNA VAG QL NAA+PROBE: NEGATIVE — SIGNIFICANT CHANGE UP
C TRACH RRNA SPEC QL NAA+PROBE: NEGATIVE — SIGNIFICANT CHANGE UP
C TRACH RRNA SPEC QL NAA+PROBE: SIGNIFICANT CHANGE UP
EOSINOPHIL # BLD AUTO: 0 K/UL — SIGNIFICANT CHANGE UP (ref 0–0.7)
EOSINOPHIL # BLD AUTO: 0 K/UL — SIGNIFICANT CHANGE UP (ref 0–0.7)
EOSINOPHIL NFR BLD AUTO: 0 % — SIGNIFICANT CHANGE UP (ref 0–8)
EOSINOPHIL NFR BLD AUTO: 0 % — SIGNIFICANT CHANGE UP (ref 0–8)
HCT VFR BLD CALC: 27.8 % — LOW (ref 37–47)
HCT VFR BLD CALC: 29 % — LOW (ref 37–47)
HGB BLD-MCNC: 9.3 G/DL — LOW (ref 12–16)
HGB BLD-MCNC: 9.8 G/DL — LOW (ref 12–16)
IMM GRANULOCYTES NFR BLD AUTO: 1.3 % — HIGH (ref 0.1–0.3)
IMM GRANULOCYTES NFR BLD AUTO: 1.3 % — HIGH (ref 0.1–0.3)
LYMPHOCYTES # BLD AUTO: 1.28 K/UL — SIGNIFICANT CHANGE UP (ref 1.2–3.4)
LYMPHOCYTES # BLD AUTO: 1.38 K/UL — SIGNIFICANT CHANGE UP (ref 1.2–3.4)
LYMPHOCYTES # BLD AUTO: 5 % — LOW (ref 20.5–51.1)
LYMPHOCYTES # BLD AUTO: 9.3 % — LOW (ref 20.5–51.1)
MCHC RBC-ENTMCNC: 31.8 PG — HIGH (ref 27–31)
MCHC RBC-ENTMCNC: 32.3 PG — HIGH (ref 27–31)
MCHC RBC-ENTMCNC: 33.5 G/DL — SIGNIFICANT CHANGE UP (ref 32–37)
MCHC RBC-ENTMCNC: 33.8 G/DL — SIGNIFICANT CHANGE UP (ref 32–37)
MCV RBC AUTO: 95.2 FL — SIGNIFICANT CHANGE UP (ref 81–99)
MCV RBC AUTO: 95.7 FL — SIGNIFICANT CHANGE UP (ref 81–99)
MEGA1 DNA VAG QL NAA+PROBE: SIGNIFICANT CHANGE UP
MONOCYTES # BLD AUTO: 0.68 K/UL — HIGH (ref 0.1–0.6)
MONOCYTES # BLD AUTO: 0.93 K/UL — HIGH (ref 0.1–0.6)
MONOCYTES NFR BLD AUTO: 3.6 % — SIGNIFICANT CHANGE UP (ref 1.7–9.3)
MONOCYTES NFR BLD AUTO: 4.6 % — SIGNIFICANT CHANGE UP (ref 1.7–9.3)
N GONORRHOEA RRNA SPEC QL NAA+PROBE: NEGATIVE — SIGNIFICANT CHANGE UP
N GONORRHOEA RRNA SPEC QL NAA+PROBE: SIGNIFICANT CHANGE UP
NEUTROPHILS # BLD AUTO: 12.59 K/UL — HIGH (ref 1.4–6.5)
NEUTROPHILS # BLD AUTO: 23.27 K/UL — HIGH (ref 1.4–6.5)
NEUTROPHILS NFR BLD AUTO: 84.6 % — HIGH (ref 42.2–75.2)
NEUTROPHILS NFR BLD AUTO: 90 % — HIGH (ref 42.2–75.2)
NRBC # BLD: 0 /100 WBCS — SIGNIFICANT CHANGE UP (ref 0–0)
NRBC # BLD: 0 /100 WBCS — SIGNIFICANT CHANGE UP (ref 0–0)
PLATELET # BLD AUTO: 331 K/UL — SIGNIFICANT CHANGE UP (ref 130–400)
PLATELET # BLD AUTO: 334 K/UL — SIGNIFICANT CHANGE UP (ref 130–400)
RBC # BLD: 2.92 M/UL — LOW (ref 4.2–5.4)
RBC # BLD: 3.03 M/UL — LOW (ref 4.2–5.4)
RBC # FLD: 13.8 % — SIGNIFICANT CHANGE UP (ref 11.5–14.5)
RBC # FLD: 13.8 % — SIGNIFICANT CHANGE UP (ref 11.5–14.5)
SPECIMEN SOURCE: SIGNIFICANT CHANGE UP
T VAGINALIS RRNA SPEC QL NAA+PROBE: NEGATIVE — SIGNIFICANT CHANGE UP
WBC # BLD: 14.87 K/UL — HIGH (ref 4.8–10.8)
WBC # BLD: 25.85 K/UL — HIGH (ref 4.8–10.8)
WBC # FLD AUTO: 14.87 K/UL — HIGH (ref 4.8–10.8)
WBC # FLD AUTO: 25.85 K/UL — HIGH (ref 4.8–10.8)

## 2022-06-16 PROCEDURE — 88305 TISSUE EXAM BY PATHOLOGIST: CPT | Mod: 26

## 2022-06-16 RX ORDER — OXYCODONE HYDROCHLORIDE 5 MG/1
5 TABLET ORAL
Refills: 0 | Status: DISCONTINUED | OUTPATIENT
Start: 2022-06-16 | End: 2022-06-16

## 2022-06-16 RX ORDER — KETOROLAC TROMETHAMINE 30 MG/ML
30 SYRINGE (ML) INJECTION ONCE
Refills: 0 | Status: DISCONTINUED | OUTPATIENT
Start: 2022-06-16 | End: 2022-06-16

## 2022-06-16 RX ORDER — OXYTOCIN 10 UNIT/ML
333.33 VIAL (ML) INJECTION
Qty: 20 | Refills: 0 | Status: DISCONTINUED | OUTPATIENT
Start: 2022-06-16 | End: 2022-06-16

## 2022-06-16 RX ORDER — GENTAMICIN SULFATE 40 MG/ML
80 VIAL (ML) INJECTION EVERY 8 HOURS
Refills: 0 | Status: DISCONTINUED | OUTPATIENT
Start: 2022-06-16 | End: 2022-06-16

## 2022-06-16 RX ORDER — OXYCODONE HYDROCHLORIDE 5 MG/1
5 TABLET ORAL ONCE
Refills: 0 | Status: DISCONTINUED | OUTPATIENT
Start: 2022-06-16 | End: 2022-06-16

## 2022-06-16 RX ORDER — SIMETHICONE 80 MG/1
80 TABLET, CHEWABLE ORAL EVERY 4 HOURS
Refills: 0 | Status: DISCONTINUED | OUTPATIENT
Start: 2022-06-16 | End: 2022-06-16

## 2022-06-16 RX ORDER — PRAMOXINE HYDROCHLORIDE 150 MG/15G
1 AEROSOL, FOAM RECTAL EVERY 4 HOURS
Refills: 0 | Status: DISCONTINUED | OUTPATIENT
Start: 2022-06-16 | End: 2022-06-16

## 2022-06-16 RX ORDER — BENZOCAINE 10 %
1 GEL (GRAM) MUCOUS MEMBRANE EVERY 6 HOURS
Refills: 0 | Status: DISCONTINUED | OUTPATIENT
Start: 2022-06-16 | End: 2022-06-16

## 2022-06-16 RX ORDER — IBUPROFEN 200 MG
1 TABLET ORAL
Qty: 0 | Refills: 0 | DISCHARGE
Start: 2022-06-16

## 2022-06-16 RX ORDER — LANOLIN
1 OINTMENT (GRAM) TOPICAL EVERY 6 HOURS
Refills: 0 | Status: DISCONTINUED | OUTPATIENT
Start: 2022-06-16 | End: 2022-06-16

## 2022-06-16 RX ORDER — IBUPROFEN 200 MG
600 TABLET ORAL ONCE
Refills: 0 | Status: COMPLETED | OUTPATIENT
Start: 2022-06-16 | End: 2022-06-16

## 2022-06-16 RX ORDER — ACETAMINOPHEN 500 MG
975 TABLET ORAL
Refills: 0 | Status: DISCONTINUED | OUTPATIENT
Start: 2022-06-16 | End: 2022-06-16

## 2022-06-16 RX ORDER — HYDROCORTISONE 1 %
1 OINTMENT (GRAM) TOPICAL EVERY 6 HOURS
Refills: 0 | Status: DISCONTINUED | OUTPATIENT
Start: 2022-06-16 | End: 2022-06-16

## 2022-06-16 RX ORDER — AER TRAVELER 0.5 G/1
1 SOLUTION RECTAL; TOPICAL EVERY 4 HOURS
Refills: 0 | Status: DISCONTINUED | OUTPATIENT
Start: 2022-06-16 | End: 2022-06-16

## 2022-06-16 RX ORDER — ACETAMINOPHEN 500 MG
3 TABLET ORAL
Qty: 0 | Refills: 0 | DISCHARGE
Start: 2022-06-16

## 2022-06-16 RX ORDER — SODIUM CHLORIDE 9 MG/ML
3 INJECTION INTRAMUSCULAR; INTRAVENOUS; SUBCUTANEOUS EVERY 8 HOURS
Refills: 0 | Status: DISCONTINUED | OUTPATIENT
Start: 2022-06-16 | End: 2022-06-16

## 2022-06-16 RX ORDER — IBUPROFEN 200 MG
600 TABLET ORAL EVERY 6 HOURS
Refills: 0 | Status: DISCONTINUED | OUTPATIENT
Start: 2022-06-16 | End: 2022-06-16

## 2022-06-16 RX ORDER — DIPHENHYDRAMINE HCL 50 MG
25 CAPSULE ORAL EVERY 6 HOURS
Refills: 0 | Status: DISCONTINUED | OUTPATIENT
Start: 2022-06-16 | End: 2022-06-16

## 2022-06-16 RX ORDER — CEFAZOLIN SODIUM 1 G
2000 VIAL (EA) INJECTION ONCE
Refills: 0 | Status: COMPLETED | OUTPATIENT
Start: 2022-06-16 | End: 2022-06-16

## 2022-06-16 RX ORDER — TETANUS TOXOID, REDUCED DIPHTHERIA TOXOID AND ACELLULAR PERTUSSIS VACCINE, ADSORBED 5; 2.5; 8; 8; 2.5 [IU]/.5ML; [IU]/.5ML; UG/.5ML; UG/.5ML; UG/.5ML
0.5 SUSPENSION INTRAMUSCULAR ONCE
Refills: 0 | Status: DISCONTINUED | OUTPATIENT
Start: 2022-06-16 | End: 2022-06-16

## 2022-06-16 RX ORDER — DIBUCAINE 1 %
1 OINTMENT (GRAM) RECTAL EVERY 6 HOURS
Refills: 0 | Status: DISCONTINUED | OUTPATIENT
Start: 2022-06-16 | End: 2022-06-16

## 2022-06-16 RX ORDER — MAGNESIUM HYDROXIDE 400 MG/1
30 TABLET, CHEWABLE ORAL
Refills: 0 | Status: DISCONTINUED | OUTPATIENT
Start: 2022-06-16 | End: 2022-06-16

## 2022-06-16 RX ADMIN — Medication 100 MILLIGRAM(S): at 02:54

## 2022-06-16 RX ADMIN — Medication 104 MILLIGRAM(S): at 10:20

## 2022-06-16 RX ADMIN — SODIUM CHLORIDE 3 MILLILITER(S): 9 INJECTION INTRAMUSCULAR; INTRAVENOUS; SUBCUTANEOUS at 18:28

## 2022-06-16 RX ADMIN — Medication 600 MILLIGRAM(S): at 07:53

## 2022-06-16 RX ADMIN — Medication 100 MILLIGRAM(S): at 17:44

## 2022-06-16 RX ADMIN — Medication 600 MILLIGRAM(S): at 17:21

## 2022-06-16 RX ADMIN — MORPHINE SULFATE 2 MILLIGRAM(S): 50 CAPSULE, EXTENDED RELEASE ORAL at 18:29

## 2022-06-16 RX ADMIN — Medication 1 TABLET(S): at 18:28

## 2022-06-16 RX ADMIN — Medication 975 MILLIGRAM(S): at 16:00

## 2022-06-16 RX ADMIN — Medication 975 MILLIGRAM(S): at 17:20

## 2022-06-16 RX ADMIN — Medication 1000 MILLIUNIT(S)/MIN: at 06:26

## 2022-06-16 RX ADMIN — Medication 104 MILLIGRAM(S): at 18:28

## 2022-06-16 RX ADMIN — Medication 100 MILLIGRAM(S): at 09:18

## 2022-06-16 NOTE — OB PROVIDER DELIVERY SUMMARY - NSPROVIDERDELIVERYNOTE_OBGYN_ALL_OB_FT
Patient for induction of labor for previable PPROM and anhydramnios at 18w4d GA. Induction with cytotec. Patient called RN and MD to bedside that she was feeling something come out. Inspected, completely intact, full fetus, placenta, and cord in sac. Blood clots gently evacuated from the uterus. Sonogram done at bedside, ESS 1.6cm. Bleeding well controlled. Uterus firm. Patient for induction of labor for previable PPROM and anhydramnios at 18w4d GA. Induction with cytotec. Patient called RN and MD to bedside that she was feeling something come out. Inspected, completely intact, full fetus, placenta, and cord in sac. Two nuchal cords noted. Blood clots gently evacuated from the uterus. Sonogram done at bedside, ESS 1.6cm. Bleeding well controlled. Uterus firm.

## 2022-06-16 NOTE — DISCHARGE NOTE OB - PATIENT PORTAL LINK FT
You can access the FollowMyHealth Patient Portal offered by [facility] by registering at the following website: [email or web address]. By joining [facility]’s FollowMyHealth portal, you will also be able to view your health information using other applications (apps) compatible with our system.

## 2022-06-16 NOTE — DISCHARGE NOTE OB - CARE PLAN
Principal Discharge DX:	Vaginal delivery  Assessment and plan of treatment:	Nothing in the vagina for 6 weeks (no sex, no tampons, no douching). Avoid tub baths, you may shower.  If you have a fever of 100.4F or greater, severe vaginal bleeding, or severe abdominal pain, call your Ob/Gyn or come to the emergency department immediately.  Please follow up with your provider in 2 weeks.   1

## 2022-06-16 NOTE — DISCHARGE NOTE OB - CARE PROVIDER_API CALL
Fiorella Pearson)  Obstetrics and Gynecology  [address] [place] [address], [address]  [place] [address]  Phone: [phone]  Fax: [phone]  Follow Up Time: 2 weeks

## 2022-06-16 NOTE — OB PROVIDER DELIVERY SUMMARY - NSSELHIDDEN_OBGYN_ALL_OB_FT
[NS_DeliveryAttending1_OBGYN_ALL_OB_FT:[id number]],[NS_DeliveryAssist1_OBGYN_ALL_OB_FT:[id number]],[NS_DeliveryAssist2_OBGYN_ALL_OB_FT:[id number]=]

## 2022-06-16 NOTE — DISCHARGE NOTE OB - PLAN OF CARE
Nothing in the vagina for 6 weeks (no sex, no tampons, no douching). Avoid tub baths, you may shower.  If you have a fever of 100.4F or greater, severe vaginal bleeding, or severe abdominal pain, call your Ob/Gyn or come to the emergency department immediately.  Please follow up with your provider in 2 weeks.

## 2022-06-16 NOTE — DISCHARGE NOTE OB - NS MD DC FALL RISK RISK
For information on Fall & Injury Prevention, visit: [email or web address] OR  [email or web address] OR  https://www.cdc.gov/steadi/patient.html

## 2022-06-16 NOTE — DISCHARGE NOTE OB - HOSPITAL COURSE
Patient had a vaginal delivery s/p IOL for anhydramnios likely secondary to previable PPROM.  Fetus delivered en caul.  Uterine fundus was firm.  Postpartum the endometrial stripe was 1.6cm.

## 2022-06-16 NOTE — OB RN DELIVERY SUMMARY - NSSELHIDDEN_OBGYN_ALL_OB_FT
[NS_DeliveryAttending1_OBGYN_ALL_OB_FT:[id number]],[NS_DeliveryAssist1_OBGYN_ALL_OB_FT:[id number]],[NS_DeliveryAssist2_OBGYN_ALL_OB_FT:[id number]],[NS_DeliveryRN_OBGYN_ALL_OB_FT:[id number]=]

## 2022-06-16 NOTE — PROGRESS NOTE ADULT - SUBJECTIVE AND OBJECTIVE BOX
31 yo  at 18.3 days today  presented to office  complaining of prolong spotting over the past few weeks.  Patient was seen in the hospital  due to spotting and evaluated without any significant findings. She was prescribed antibiotics for UTI  due to UTI.  Patient presented to office again  for a visit , reported spotting since  , on sono - very law amount of amnioptic fluid was noted and patient  remembered she had one night a gush  of fluid  on the bed when she woke up ,but no leaking since. She denied fever or abdomianl pain. Patient was sent to L&D to r/o pprom. No ferning was noted at that time , and kierra was wnl.   Patient was sent to [facility] for official sono and today was seen in office by Dr Palafox - anhydramnios was noted and patient was given options  for   1) expectant management once infection was ruled out with risk to the baby development giving anhydramnios 2  induction for termination of pregnancy due to high suspicion of membrane rupture.    Patient denies any pain , fever, or contractions, no spotting  now for a few days.    I discussed choices with patient and her   Option of leaving after infection ruled out and expectant management vs induction of labor due to anhydramnios discussed again.   all r/b/a discussed.  Questions re her myomas and possible excision d/w patient for the future pregnancises  Patient opted for termination at this time.  Cytotec induction d/w patient  Risk of retained placenta and d &C , bleeding d/w patient.  ALL questions answered  a/p 31 yo  at 18.3 wks suspected pprom adn anhydramnios anemia, fibroid uterus for TOP   blood work  vag cx   ur cs  ivf  cytotec 400mg pv q 3 hrs until delivery   pain management prn  toco         
31 yo p0020 s/p induced termination due to suspected pprom and anhydramnios  pregnancy delivered en cull at 1:46 am boy , 2 nuchals, cytogenetics was sent  mod lochia noted  fundus firm at umbilicus and fundal myoma palpated ( 12 + cm by sono)  extr nt ne  patient comfortable with epidural     wbc 15   a/p 31 yo p0020 s/p induced top with cytotec  at 18.4 wks  for pprom, anhydramnios   cbc in am   ancef 1 gm iv to give  pitocin post delivery  possible plan for  myomectomy d/w patient after she recovers  vag cx  ur cx  f/up in office in 2 weeks 
PGY2 Note    S: Patient seen and examined at bedside. Doing well, cramping pain noted.     Vital Signs Last 24 Hrs  T(C): 36.8 (15 Waqar 2022 11:55), Max: 36.8 (15 Waqar 2022 11:55)  T(F): 98.2 (15 Waqar 2022 11:55), Max: 98.2 (15 Waqar 2022 11:55)  HR: 104 (15 Waqar 2022 22:47) (88 - 113)  BP: 126/74 (15 Waqar 2022 22:47) (97/53 - 126/74)  RR: 18 (15 Waqar 2022 12:25) (18 - 18)    SVE: /-3, cytotec #4 inserted     Labs:                        10.3   15.31 )-----------( 376      ( 15 Waqar 2022 12:52 )             30.4       ABO RH Interpretation: B POS (06-15-22 @ 14:37)    Urinalysis Basic - ( 15 Waqar 2022 12:53 )  Color: Light Yellow / Appearance: Slightly Turbid / S.003 / pH: x  Gluc: x / Ketone: Negative  / Bili: Negative / Urobili: <2 mg/dL   Blood: x / Protein: Trace / Nitrite: Negative   Leuk Esterase: Large / RBC: 2 /HPF /  /HPF   Sq Epi: x / Non Sq Epi: 3 /HPF / Bacteria: Few    MEDICATIONS  (STANDING):  lactated ringers. 1000 milliLiter(s) (125 mL/Hr) IV Continuous <Continuous>  misoprostol 400 MICROGram(s) Oral every 3 hours  morphine  - Injectable 2 milliGRAM(s) IV Push once  
33 yo p0020 s/p 18 + 2 wks delivery for PPROM, anhydramnios   WBC  on repeat was noted to be 26 from 15  Decision was made to observe patient till pm and repeat cbc .  Patient was treated with IV antibiotics , ancef, gent and clinda 2 doses.  Patient had fever 100.5   CBC repeat at 6 pm wbc 14  hgb 9.3  Patient is dc/d home with po antibiotics - Augmentin 875 po bid for a week  cytogenetics sent  vag cx, pending    f/up in office in 2 weeks 
s/p induced top  at 18.3 wks for  pprom and anhydramnios  wbc 26 increased form 15 hgb 10   afebrile s/p 1 dose of ancef  feeling well  PE   nl heart and lung exam   abdomen fundus firm with large fibroid occupying part of fundus  lochia min  extr nt ne  a/p 31 yo p0020 s/p induced top for anhydramnios  genta /clinda iv abx  cbc  at 6 pm   if wbc improved will d/c in pm    
PGY2 Note    Patient seen at bedside for evaluation of induction of labor, currently endorses mild abdominal cramping, does not want pain medication at this time. Second dose of cytotec placed    T(F): 98.2 (06-15-22 @ 11:55), Max: 98.2 (06-15-22 @ 11:55)  HR: 94 (06-15-22 @ 16:02) (88 - 113)  BP: 99/50 (06-15-22 @ 16:02) (97/53 - 124/83)  RR: 18 (06-15-22 @ 12:25) (18 - 18)    TOCO: irritability  SVE: FT/50/-3    Medications:  misoprostol: 400 MICROGram(s) (06-15-22 @ 13:15)      Labs:                        10.3   15.31 )-----------( 376      ( 15 Waqar 2022 12:52 )             30.4           ABO RH Interpretation: B POS (06-15-22 @ 14:37)    Antibody Screen: NEG (06-15-22 @ 14:37)    Urinalysis Basic - ( 15 Waqar 2022 12:53 )    Color: Light Yellow / Appearance: Slightly Turbid / S.003 / pH: x  Gluc: x / Ketone: Negative  / Bili: Negative / Urobili: <2 mg/dL   Blood: x / Protein: Trace / Nitrite: Negative   Leuk Esterase: Large / RBC: 2 /HPF /  /HPF   Sq Epi: x / Non Sq Epi: 3 /HPF / Bacteria: Few                    
PGY4 Note    Patient seen at bedside for evaluation of labor progression, doing well, reports abdominal cramping and some spotting.    T(F): 98.2 (06-15-22 @ 11:55), Max: 98.2 (06-15-22 @ 11:55)  HR: 105 (06-15-22 @ 19:32) (88 - 113)  BP: 123/59 (06-15-22 @ 19:32) (97/53 - 124/83)  RR: 18 (06-15-22 @ 12:25) (18 - 18)    TOCO: irritability  SVE: 1/0/-3     Medications:  cytotec 400mcg @1315, @1615, @1920      Labs:                        10.3   15.31 )-----------( 376      ( 15 Waqar 2022 12:52 )             30.4           ABO RH Interpretation: B POS (06-15-22 @ 14:37)    Antibody Screen: NEG (06-15-22 @ 14:37)    Urinalysis Basic - ( 15 Waqar 2022 12:53 )    Color: Light Yellow / Appearance: Slightly Turbid / S.003 / pH: x  Gluc: x / Ketone: Negative  / Bili: Negative / Urobili: <2 mg/dL   Blood: x / Protein: Trace / Nitrite: Negative   Leuk Esterase: Large / RBC: 2 /HPF /  /HPF   Sq Epi: x / Non Sq Epi: 3 /HPF / Bacteria: Few

## 2022-06-17 LAB
CULTURE RESULTS: SIGNIFICANT CHANGE UP
SPECIMEN SOURCE: SIGNIFICANT CHANGE UP

## 2022-06-20 LAB — SURGICAL PATHOLOGY STUDY: SIGNIFICANT CHANGE UP

## 2022-06-21 DIAGNOSIS — O34.12 MATERNAL CARE FOR BENIGN TUMOR OF CORPUS UTERI, SECOND TRIMESTER: ICD-10-CM

## 2022-06-21 DIAGNOSIS — Z3A.18 18 WEEKS GESTATION OF PREGNANCY: ICD-10-CM

## 2022-06-21 DIAGNOSIS — O42.112 PRETERM PREMATURE RUPTURE OF MEMBRANES, ONSET OF LABOR MORE THAN 24 HOURS FOLLOWING RUPTURE, SECOND TRIMESTER: ICD-10-CM

## 2022-06-21 DIAGNOSIS — O99.012 ANEMIA COMPLICATING PREGNANCY, SECOND TRIMESTER: ICD-10-CM

## 2022-06-21 DIAGNOSIS — O41.02X0 OLIGOHYDRAMNIOS, SECOND TRIMESTER, NOT APPLICABLE OR UNSPECIFIED: ICD-10-CM

## 2022-06-21 DIAGNOSIS — Z28.310 UNVACCINATED FOR COVID-19: ICD-10-CM

## 2022-06-21 DIAGNOSIS — O26.852 SPOTTING COMPLICATING PREGNANCY, SECOND TRIMESTER: ICD-10-CM

## 2022-06-21 DIAGNOSIS — Z28.21 IMMUNIZATION NOT CARRIED OUT BECAUSE OF PATIENT REFUSAL: ICD-10-CM

## 2022-06-21 DIAGNOSIS — D25.2 SUBSEROSAL LEIOMYOMA OF UTERUS: ICD-10-CM

## 2022-06-24 ENCOUNTER — APPOINTMENT (OUTPATIENT)
Dept: ANTEPARTUM | Facility: CLINIC | Age: 32
End: 2022-06-24

## 2022-06-27 ENCOUNTER — APPOINTMENT (OUTPATIENT)
Dept: OBGYN | Facility: CLINIC | Age: 32
End: 2022-06-27
Payer: COMMERCIAL

## 2022-06-27 VITALS
WEIGHT: 160 LBS | SYSTOLIC BLOOD PRESSURE: 113 MMHG | DIASTOLIC BLOOD PRESSURE: 75 MMHG | TEMPERATURE: 97.9 F | HEART RATE: 91 BPM | BODY MASS INDEX: 28.35 KG/M2 | HEIGHT: 63 IN

## 2022-06-27 DIAGNOSIS — D64.9 ANEMIA, UNSPECIFIED: ICD-10-CM

## 2022-06-27 PROCEDURE — 99213 OFFICE O/P EST LOW 20 MIN: CPT

## 2022-06-27 PROCEDURE — 99072 ADDL SUPL MATRL&STAF TM PHE: CPT

## 2022-06-27 NOTE — HISTORY OF PRESENT ILLNESS
[FreeTextEntry1] : 33 yo p 0020 Patient is here for follow/up , induced termination at 18 +  on 6-,  due to suspected PPROM.\par Patient is doing well right now, minimal spotting, no  cramping .\par \par PATH for placenta showed acute chorioamnionitis, patient had 2 uti's untill pprom and started taking abx a day before she PPROM.\par she was induced with cytotec  and given 2 doses of iv and some po antibiotics when discharged.

## 2022-06-27 NOTE — PHYSICAL EXAM
[Appropriately responsive] : appropriately responsive [Alert] : alert [No Acute Distress] : no acute distress [No Lymphadenopathy] : no lymphadenopathy [Soft] : soft [Non-tender] : non-tender [Non-distended] : non-distended [No HSM] : No HSM [No Lesions] : no lesions [No Mass] : no mass [Oriented x3] : oriented x3 [Labia Majora] : normal [Labia Minora] : normal [Scant] : There was scant vaginal bleeding [Normal] : normal [Normal Position] : in a normal position [Enlarged ___ wks] : enlarged [unfilled] ~Uweeks [Uterine Adnexae] : normal

## 2022-06-27 NOTE — DISCUSSION/SUMMARY
[FreeTextEntry1] : 31 yo p0020 s/p 18+wks pprom induced top , large fibroid uterus, \par - options of myomectomy  d/w patient - open and possibly robotic\par after surgery wait time for next pregnancy and c/s for delivery d/w patient \par - pelvic/abdominal MRI to determine fibroid size and location\par - MFM consult re future pregnanes - cause of pprom - vaginal infection/ uti or fibroid uterus \par - rto for vag cx for infection\par - cbc , bmp today\par - contraception and preconception pnv d/w patient.\par

## 2022-06-28 LAB — CHROM ANALY OVERALL INTERP SPEC-IMP: SIGNIFICANT CHANGE UP

## 2022-06-29 LAB
ANION GAP SERPL CALC-SCNC: 14 MMOL/L
BASOPHILS # BLD AUTO: 0.03 K/UL
BASOPHILS NFR BLD AUTO: 0.4 %
BUN SERPL-MCNC: 8 MG/DL
CALCIUM SERPL-MCNC: 9.7 MG/DL
CHLORIDE SERPL-SCNC: 103 MMOL/L
CO2 SERPL-SCNC: 23 MMOL/L
CREAT SERPL-MCNC: 0.7 MG/DL
EGFR: 119 ML/MIN/1.73M2
EOSINOPHIL # BLD AUTO: 0.01 K/UL
EOSINOPHIL NFR BLD AUTO: 0.1 %
GLUCOSE SERPL-MCNC: 87 MG/DL
HCT VFR BLD CALC: 35.2 %
HGB BLD-MCNC: 10.9 G/DL
IMM GRANULOCYTES NFR BLD AUTO: 0.4 %
LYMPHOCYTES # BLD AUTO: 2.16 K/UL
LYMPHOCYTES NFR BLD AUTO: 28.9 %
MAN DIFF?: NORMAL
MCHC RBC-ENTMCNC: 30.8 PG
MCHC RBC-ENTMCNC: 31 G/DL
MCV RBC AUTO: 99.4 FL
MONOCYTES # BLD AUTO: 0.41 K/UL
MONOCYTES NFR BLD AUTO: 5.5 %
NEUTROPHILS # BLD AUTO: 4.83 K/UL
NEUTROPHILS NFR BLD AUTO: 64.7 %
PLATELET # BLD AUTO: 581 K/UL
POTASSIUM SERPL-SCNC: 4.1 MMOL/L
RBC # BLD: 3.54 M/UL
RBC # FLD: 13.4 %
SODIUM SERPL-SCNC: 140 MMOL/L
WBC # FLD AUTO: 7.47 K/UL

## 2022-06-30 RX ORDER — CHLORHEXIDINE GLUCONATE 4 %
325 (65 FE) LIQUID (ML) TOPICAL TWICE DAILY
Qty: 60 | Refills: 2 | Status: ACTIVE | COMMUNITY
Start: 2022-06-29 | End: 1900-01-01

## 2022-07-12 LAB — PRENATAL GENOME CHROMO MICROARRAY: NEGATIVE — SIGNIFICANT CHANGE UP

## 2022-07-29 ENCOUNTER — EMERGENCY (EMERGENCY)
Facility: HOSPITAL | Age: 32
LOS: 0 days | Discharge: HOME | End: 2022-07-29
Attending: EMERGENCY MEDICINE | Admitting: EMERGENCY MEDICINE

## 2022-07-29 VITALS
HEIGHT: 63 IN | HEART RATE: 89 BPM | TEMPERATURE: 99 F | WEIGHT: 160.06 LBS | DIASTOLIC BLOOD PRESSURE: 80 MMHG | SYSTOLIC BLOOD PRESSURE: 135 MMHG | RESPIRATION RATE: 16 BRPM | OXYGEN SATURATION: 98 %

## 2022-07-29 DIAGNOSIS — R00.0 TACHYCARDIA, UNSPECIFIED: ICD-10-CM

## 2022-07-29 DIAGNOSIS — R10.13 EPIGASTRIC PAIN: ICD-10-CM

## 2022-07-29 DIAGNOSIS — Z86.2 PERSONAL HISTORY OF DISEASES OF THE BLOOD AND BLOOD-FORMING ORGANS AND CERTAIN DISORDERS INVOLVING THE IMMUNE MECHANISM: ICD-10-CM

## 2022-07-29 DIAGNOSIS — R10.11 RIGHT UPPER QUADRANT PAIN: ICD-10-CM

## 2022-07-29 DIAGNOSIS — R11.2 NAUSEA WITH VOMITING, UNSPECIFIED: ICD-10-CM

## 2022-07-29 DIAGNOSIS — D25.9 LEIOMYOMA OF UTERUS, UNSPECIFIED: ICD-10-CM

## 2022-07-29 LAB
ALBUMIN SERPL ELPH-MCNC: 4.5 G/DL — SIGNIFICANT CHANGE UP (ref 3.5–5.2)
ALP SERPL-CCNC: 129 U/L — HIGH (ref 30–115)
ALT FLD-CCNC: 21 U/L — SIGNIFICANT CHANGE UP (ref 0–41)
ANION GAP SERPL CALC-SCNC: 12 MMOL/L — SIGNIFICANT CHANGE UP (ref 7–14)
APPEARANCE UR: CLEAR — SIGNIFICANT CHANGE UP
AST SERPL-CCNC: 31 U/L — SIGNIFICANT CHANGE UP (ref 0–41)
BACTERIA # UR AUTO: NEGATIVE — SIGNIFICANT CHANGE UP
BASOPHILS # BLD AUTO: 0.04 K/UL — SIGNIFICANT CHANGE UP (ref 0–0.2)
BASOPHILS NFR BLD AUTO: 0.5 % — SIGNIFICANT CHANGE UP (ref 0–1)
BILIRUB SERPL-MCNC: 0.3 MG/DL — SIGNIFICANT CHANGE UP (ref 0.2–1.2)
BILIRUB UR-MCNC: NEGATIVE — SIGNIFICANT CHANGE UP
BUN SERPL-MCNC: 15 MG/DL — SIGNIFICANT CHANGE UP (ref 10–20)
CALCIUM SERPL-MCNC: 9.2 MG/DL — SIGNIFICANT CHANGE UP (ref 8.5–10.1)
CHLORIDE SERPL-SCNC: 103 MMOL/L — SIGNIFICANT CHANGE UP (ref 98–110)
CO2 SERPL-SCNC: 24 MMOL/L — SIGNIFICANT CHANGE UP (ref 17–32)
COD CRY URNS QL: ABNORMAL
COLOR SPEC: SIGNIFICANT CHANGE UP
COMMENT - URINE: SIGNIFICANT CHANGE UP
CREAT SERPL-MCNC: 0.8 MG/DL — SIGNIFICANT CHANGE UP (ref 0.7–1.5)
DIFF PNL FLD: NEGATIVE — SIGNIFICANT CHANGE UP
EGFR: 101 ML/MIN/1.73M2 — SIGNIFICANT CHANGE UP
EOSINOPHIL # BLD AUTO: 0.01 K/UL — SIGNIFICANT CHANGE UP (ref 0–0.7)
EOSINOPHIL NFR BLD AUTO: 0.1 % — SIGNIFICANT CHANGE UP (ref 0–8)
EPI CELLS # UR: 4 /HPF — SIGNIFICANT CHANGE UP (ref 0–5)
GLUCOSE SERPL-MCNC: 110 MG/DL — HIGH (ref 70–99)
GLUCOSE UR QL: NEGATIVE — SIGNIFICANT CHANGE UP
HCG SERPL QL: NEGATIVE — SIGNIFICANT CHANGE UP
HCT VFR BLD CALC: 39 % — SIGNIFICANT CHANGE UP (ref 37–47)
HGB BLD-MCNC: 12.7 G/DL — SIGNIFICANT CHANGE UP (ref 12–16)
HYALINE CASTS # UR AUTO: 0 /LPF — SIGNIFICANT CHANGE UP (ref 0–7)
IMM GRANULOCYTES NFR BLD AUTO: 0.3 % — SIGNIFICANT CHANGE UP (ref 0.1–0.3)
KETONES UR-MCNC: NEGATIVE — SIGNIFICANT CHANGE UP
LEUKOCYTE ESTERASE UR-ACNC: ABNORMAL
LIDOCAIN IGE QN: 51 U/L — SIGNIFICANT CHANGE UP (ref 7–60)
LYMPHOCYTES # BLD AUTO: 2.12 K/UL — SIGNIFICANT CHANGE UP (ref 1.2–3.4)
LYMPHOCYTES # BLD AUTO: 28.6 % — SIGNIFICANT CHANGE UP (ref 20.5–51.1)
MCHC RBC-ENTMCNC: 30.6 PG — SIGNIFICANT CHANGE UP (ref 27–31)
MCHC RBC-ENTMCNC: 32.6 G/DL — SIGNIFICANT CHANGE UP (ref 32–37)
MCV RBC AUTO: 94 FL — SIGNIFICANT CHANGE UP (ref 81–99)
MONOCYTES # BLD AUTO: 0.42 K/UL — SIGNIFICANT CHANGE UP (ref 0.1–0.6)
MONOCYTES NFR BLD AUTO: 5.7 % — SIGNIFICANT CHANGE UP (ref 1.7–9.3)
NEUTROPHILS # BLD AUTO: 4.79 K/UL — SIGNIFICANT CHANGE UP (ref 1.4–6.5)
NEUTROPHILS NFR BLD AUTO: 64.8 % — SIGNIFICANT CHANGE UP (ref 42.2–75.2)
NITRITE UR-MCNC: NEGATIVE — SIGNIFICANT CHANGE UP
NRBC # BLD: 0 /100 WBCS — SIGNIFICANT CHANGE UP (ref 0–0)
PH UR: 5.5 — SIGNIFICANT CHANGE UP (ref 5–8)
PLATELET # BLD AUTO: 345 K/UL — SIGNIFICANT CHANGE UP (ref 130–400)
POTASSIUM SERPL-MCNC: 4.1 MMOL/L — SIGNIFICANT CHANGE UP (ref 3.5–5)
POTASSIUM SERPL-SCNC: 4.1 MMOL/L — SIGNIFICANT CHANGE UP (ref 3.5–5)
PROT SERPL-MCNC: 7.9 G/DL — SIGNIFICANT CHANGE UP (ref 6–8)
PROT UR-MCNC: ABNORMAL
RBC # BLD: 4.15 M/UL — LOW (ref 4.2–5.4)
RBC # FLD: 13.4 % — SIGNIFICANT CHANGE UP (ref 11.5–14.5)
RBC CASTS # UR COMP ASSIST: 2 /HPF — SIGNIFICANT CHANGE UP (ref 0–4)
SODIUM SERPL-SCNC: 139 MMOL/L — SIGNIFICANT CHANGE UP (ref 135–146)
SP GR SPEC: 1.02 — SIGNIFICANT CHANGE UP (ref 1.01–1.03)
UROBILINOGEN FLD QL: SIGNIFICANT CHANGE UP
WBC # BLD: 7.4 K/UL — SIGNIFICANT CHANGE UP (ref 4.8–10.8)
WBC # FLD AUTO: 7.4 K/UL — SIGNIFICANT CHANGE UP (ref 4.8–10.8)
WBC UR QL: 9 /HPF — HIGH (ref 0–5)

## 2022-07-29 PROCEDURE — 71045 X-RAY EXAM CHEST 1 VIEW: CPT | Mod: 26

## 2022-07-29 PROCEDURE — 99053 MED SERV 10PM-8AM 24 HR FAC: CPT

## 2022-07-29 PROCEDURE — 76705 ECHO EXAM OF ABDOMEN: CPT | Mod: 26

## 2022-07-29 PROCEDURE — 99285 EMERGENCY DEPT VISIT HI MDM: CPT

## 2022-07-29 PROCEDURE — 93010 ELECTROCARDIOGRAM REPORT: CPT

## 2022-07-29 RX ORDER — FAMOTIDINE 10 MG/ML
20 INJECTION INTRAVENOUS ONCE
Refills: 0 | Status: COMPLETED | OUTPATIENT
Start: 2022-07-29 | End: 2022-07-29

## 2022-07-29 RX ORDER — MORPHINE SULFATE 50 MG/1
4 CAPSULE, EXTENDED RELEASE ORAL ONCE
Refills: 0 | Status: COMPLETED | OUTPATIENT
Start: 2022-07-29 | End: 2022-07-29

## 2022-07-29 RX ORDER — ONDANSETRON 8 MG/1
4 TABLET, FILM COATED ORAL ONCE
Refills: 0 | Status: COMPLETED | OUTPATIENT
Start: 2022-07-29 | End: 2022-07-29

## 2022-07-29 RX ORDER — SODIUM CHLORIDE 9 MG/ML
2300 INJECTION, SOLUTION INTRAVENOUS ONCE
Refills: 0 | Status: COMPLETED | OUTPATIENT
Start: 2022-07-29 | End: 2022-07-29

## 2022-07-29 RX ADMIN — ONDANSETRON 4 MILLIGRAM(S): 8 TABLET, FILM COATED ORAL at 03:56

## 2022-07-29 RX ADMIN — SODIUM CHLORIDE 2300 MILLILITER(S): 9 INJECTION, SOLUTION INTRAVENOUS at 03:56

## 2022-07-29 RX ADMIN — FAMOTIDINE 20 MILLIGRAM(S): 10 INJECTION INTRAVENOUS at 03:56

## 2022-07-29 NOTE — ED ADULT TRIAGE NOTE - CHIEF COMPLAINT QUOTE
Pt ambulatory to the ER and endorses epigastric abdominal pain that began around approximately 10pm yesterday. Pt endorses nausea and vomiting. Pt ambulatory to the ER and endorses epigastric abdominal pain and headache that began around approximately 10pm yesterday. Pt also endorses nausea and vomiting.

## 2022-07-29 NOTE — ED PROVIDER NOTE - NSFOLLOWUPINSTRUCTIONS_ED_ALL_ED_FT
Please follow up with your primary care doctor in 1-2 days.  Please use acetaminophen as needed for pain.  Please return to the emergency department if you have worsening pain, chest pain, shortness of breath, vomiting, fever, black or bloody stool or urine, trouble urinating, or any other symptoms.      Abdominal Pain    WHAT YOU NEED TO KNOW:    Abdominal pain can be dull, achy, or sharp. You may have pain in one area of your abdomen, or in your entire abdomen. Your pain may be caused by a condition such as constipation, food sensitivity or poisoning, infection, or a blockage. Abdominal pain can also be from a hernia, appendicitis, or an ulcer. Liver, gallbladder, or kidney conditions can also cause abdominal pain. The cause of your abdominal pain may be unknown.     DISCHARGE INSTRUCTIONS:    Return to the emergency department if:   •You have new chest pain or shortness of breath.  •You have pulsing pain in your upper abdomen or lower back that suddenly becomes constant.  •Your pain is in the right lower abdominal area and worsens with movement.   •You have a fever over 100.4°F (38°C) or shaking chills.   •You are vomiting and cannot keep food or liquids down.   •Your pain does not improve or gets worse over the next 8 to 12 hours.   •You see blood in your vomit or bowel movements, or they look black and tarry.   •Your skin or the whites of your eyes turn yellow.   •You are a woman and have a large amount of vaginal bleeding that is not your monthly period.     Contact your healthcare provider if:   •You have pain in your lower back.   •You are a man and have pain in your testicles.  •You have pain when you urinate.   •You have questions or concerns about your condition or care.    Follow up with your healthcare provider within 24 hours or as directed: Write down your questions so you remember to ask them during your visits.     Medicines:   •Medicines may be given to calm your stomach and prevent vomiting or to decrease pain. Ask how to take pain medicine safely.  •Take your medicine as directed. Contact your healthcare provider if you think your medicine is not helping or if you have side effects. Tell him of her if you are allergic to any medicine. Keep a list of the medicines, vitamins, and herbs you take. Include the amounts, and when and why you take them. Bring the list or the pill bottles to follow-up visits. Carry your medicine list with you in case of an emergency.

## 2022-07-29 NOTE — ED ADULT NURSE NOTE - NSIMPLEMENTINTERV_GEN_ALL_ED
Implemented All Universal Safety Interventions:  [place] to call system. Call bell, personal items and telephone within reach. Instruct patient to call for assistance. Room bathroom lighting operational. Non-slip footwear when patient is off stretcher. Physically safe environment: no spills, clutter or unnecessary equipment. Stretcher in lowest position, wheels locked, appropriate side rails in place.

## 2022-07-29 NOTE — ED ADULT TRIAGE NOTE - TEMPERATURE IN FAHRENHEIT (DEGREES F)
"Chief Complaint   Patient presents with     Cough     deep cough (barky), wakes him up at night     Patient is here with his mom. Patient stated he has had this cough for a good month.    Initial BP (!) 142/84 (BP Location: Left arm, Patient Position: Sitting, Cuff Size: Adult Regular)   Pulse 76   Temp 97.1  F (36.2  C) (Tympanic)   Resp 12   Ht 1.702 m (5' 7\")   Wt 60.9 kg (134 lb 3.2 oz)   SpO2 100%   BMI 21.02 kg/m   Estimated body mass index is 21.02 kg/m  as calculated from the following:    Height as of this encounter: 1.702 m (5' 7\").    Weight as of this encounter: 60.9 kg (134 lb 3.2 oz).  Medication Reconciliation: Completed     Advanced Care Directive Reviewed    Caleb Zamorano LPN  "
98.8

## 2022-07-29 NOTE — ED PROVIDER NOTE - PHYSICAL EXAMINATION
Constitutional: Uncomfortable appearing  Non toxic.   Eyes: PERRLA. Extraocular movements intact, no entrapment. Conjunctiva normal.   ENT: No nasal discharge. Moist mucus membranes.  Neck: Supple, non tender, full range of motion.  CV: RRR no murmurs, rubs, or gallops. +S1S2.   Pulm: Clear to auscultation bilaterally. Normal work of breathing.  Abd: soft epigastric and RUQ ttp ND +BS.   Ext: Warm and well perfused x4, moving all extremities, no edema.   Psy: Cooperative, appropriate.   Skin: Warm, dry, no rash  Neuro: nonfocal

## 2022-07-29 NOTE — ED PROVIDER NOTE - PATIENT PORTAL LINK FT
You can access the FollowMyHealth Patient Portal offered by [facility] by registering at the following website: [email or web address]. By joining [facility]’s FollowMyHealth portal, you will also be able to view your health information using other applications (apps) compatible with our system.

## 2022-07-29 NOTE — ED PROVIDER NOTE - OBJECTIVE STATEMENT
32-year-old female history of iron deficiency anemia, uterine fibroids presenting for evaluation of epigastric abdominal pain with associated nausea vomiting since yesterday evening.  No fevers or chills.  No diarrhea.  No history of abdominal surgeries.  Symptoms gradual onset, moderate to severe.  No exacerbating or alleviating factors.

## 2022-07-29 NOTE — ED ADULT NURSE NOTE - CHIEF COMPLAINT QUOTE
Pt ambulatory to the ER and endorses epigastric abdominal pain and headache that began around approximately 10pm yesterday. Pt also endorses nausea and vomiting.

## 2022-07-29 NOTE — ED PROVIDER NOTE - PROGRESS NOTE DETAILS
pt s/o to Dr. Dumont pending labs/reeval/dispo ADDENDUM by Viv Dumont M.D.: I received sign-off from Dr. FREDDY Dai. 32-year-old female with history of anemia, presents with epigastric/right upper quadrant pain since last night, ultrasound unremarkable, I was to follow-up labs and discharge if unremarkable.  On assessment, patient confirms history since 2 AM yesterday, also emesis x1 on arrival here.  Denies emesis since then.  States she declines analgesia given, though feels significantly improved though still some residual epigastric pain.  Denies all other symptoms including fever, diarrhea, constipation, urinary symptoms.  On exam, NAD, well-appearing, sitting comfortably in bed, no WOB, RRR, abdomen soft, mild epigastric TTP with no rebound/guarding, skin warm/well-perfused, no CVAT.  Abdomen nonperitoneal.  Noted UA findings, no urinary symptoms, and location of pain/context high suspicion for this being asymptomatic bacteriuria.  Character and appearance low suspicion for torsion.  Labs unremarkable. ADDENDUM by Viv Dumont M.D.: I received sign-off from Dr. FREDDY Dai. 32-year-old female with history of anemia, presents with epigastric/right upper quadrant pain since last night, ultrasound unremarkable, I was to follow-up labs and discharge if unremarkable.  On assessment, patient confirms history since 2 AM yesterday, also emesis x1 on arrival here.  Denies emesis since then.  States she declines analgesia given, though feels significantly improved though still some residual epigastric pain.  Denies all other symptoms including fever, diarrhea, constipation, urinary symptoms.  On exam, NAD, well-appearing, sitting comfortably in bed, no WOB, RRR, abdomen soft, mild epigastric TTP with no rebound/guarding, skin warm/well-perfused, no CVAT.  Abdomen nonperitoneal.  Noted UA findings, no urinary symptoms, and location of pain/context high suspicion for this being asymptomatic bacteriuria.  Character and appearance low suspicion for torsion.  Labs unremarkable.  In light of continued tenderness and pain, I had shared decision-making conversation with patient regarding CT abdomen.  Patient does not want to pursue this at this time.  She also states she has an MRI abdomen which she will schedule for next week.  I discussed risk and benefits and possible complications of pursuing MRI at this time.  She is alert and oriented and displays appropriate decision-making capacity.  She was welcomed to return if she desired to pursue CT or if she had worsening symptoms.

## 2022-07-29 NOTE — ED PROVIDER NOTE - NS ED ROS FT
Constitutional:  See HPI.   Eyes:  No visual changes, eye pain or discharge.  ENMT:  No hearing changes, pain, discharge or infections. No neck pain or stiffness.  Cardiac:  No chest pain, SOB or edema. No chest pain with exertion.  Respiratory:  No cough or respiratory distress. No hemoptysis.    :  No dysuria, frequency, hematuria  MS:  No joint pain or back pain.  Neuro:  No LOC.   Skin:  No skin rash.  Except as in HPI, all other review of systems is negative

## 2022-07-30 LAB
CULTURE RESULTS: SIGNIFICANT CHANGE UP
SPECIMEN SOURCE: SIGNIFICANT CHANGE UP

## 2022-12-01 NOTE — PROCEDURE NOTE - NSCATHTHREAD_OBGYN_ALL_OB
Easily/5-8 cm Low Dose Naltrexone Pregnancy And Lactation Text: Naltrexone is pregnancy category C.  There have been no adequate and well-controlled studies in pregnant women.  It should be used in pregnancy only if the potential benefit justifies the potential risk to the fetus.   Limited data indicates that naltrexone is minimally excreted into breastmilk.

## 2023-01-07 NOTE — DISCHARGE NOTE OB - NPI NUMBER (FOR SYSADMIN USE ONLY) :
Recommendations from today's MTM visit:                                                       1. Will stop glipizide - I will talk to nurse that sets up medication.    2. Refill amtriptine 1 daily at bedtime.       Follow-up: Return in about 6 weeks (around 3/21/2022) for MTM visit in clinic.    It was great to speak with you today.  I value your experience and would be very thankful for your time with providing feedback on our clinic survey. You may receive a survey via email or text message in the next few days.     To schedule another MTM appointment, please call the clinic directly or you may call the MTM scheduling line at [phone] or toll-free at [phone].     My Clinical Pharmacist's contact information:                                                      Please feel free to contact me with any questions or concerns you have.      Chelo Roberts, Pharm.D, [facility]  Medication Therapy Management Pharmacist  [phone]      
[[phone]]
Negative

## 2023-02-09 NOTE — PROCEDURAL SAFETY CHECKLIST WITH OR WITHOUT SEDATION - NSPREPROCLOCFT_GEN_ALL_CORE
Pt ambulated to restroom with RN. Pt was able to walk under own power however was unsteady at times. Pt assisted back into bed. She asked RN if she would be tested for UTI, RN confirmed a UA test was ordered. Pt stated she has a history of frequent UTI's and was told to use coocnut oil at Urgent Care. Pt reports rubbing coconut oil on genitalia to assist with UTI symptoms. Pt is resting in bed at this time and provider was informed of the above stated information.       Adelina Ramirez RN  02/09/23 3951 cheryl and d

## 2023-11-23 NOTE — DISCHARGE NOTE OB - MEDICATION SUMMARY - MEDICATIONS TO TAKE
PHYSICAL THERAPY DAILY NOTE  Time In:  1021  Time Out:  1125  Total Treatment Time:  59 Minutes  Pt. Seen for: AM, Balance Training, Gait Training, Patient Education, Transfer Training, Wheelchair mobility, and Other     Subjective: Patient reporting he wants to be able to go up/down his steps at home. Reports he has been up/down steps using crutches in the past. Reports his bedroom and bathroom are upstairs. Reports he can have his family bring his meals to him upstairs if needed. Objective:  Precautions: Falls and WB: TDWB LLE with left knee brace locked in extension. 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 SPT with crutches and min assist.  Transfer Assistance: CGA  Car Transfers: NT  BSC transfers. CGA to SBA with SPT with RW to and from [id number] [facility] transfer bench transfers; Min assist with SPT with RW, on and off tub transfer bench.          GAIT Daily Assessment   Gait training with slow start/stop step to gait pattern leading with LLE and stepping to with RLE, TDWB LLE with knee brace locked in extension Amount of Assistance: Supervision/Standby Assist and CGA  Distance (ft): 100ft x 1 with RW and SBA, 60 ft x 1 with crutches and min assist  Assistive Device: RW, Crutches, and Gait Belt  Surface: Level Surface       STEPS/STAIRS Daily Assessment   Single step at a time leading up with RLE and down with I will START or STAY ON the medications listed below when I get home from the hospital:    acetaminophen 325 mg oral tablet  -- 3 tab(s) by mouth every 6 hours, As Needed  -- Indication: For pain    ibuprofen 600 mg oral tablet  -- 1 tab(s) by mouth every 6 hours, As Needed  -- Indication: For pain

## 2024-01-29 NOTE — OB PROVIDER TRIAGE NOTE - DOMESTIC TRAVEL HIGH RISK QUESTION
I called patient reviewed why we have him on Coumadin.  I explained if he wanted to we could change him over.   No

## 2024-03-04 ENCOUNTER — ASOB RESULT (OUTPATIENT)
Age: 34
End: 2024-03-04

## 2024-03-04 ENCOUNTER — OUTPATIENT (OUTPATIENT)
Dept: OUTPATIENT SERVICES | Facility: HOSPITAL | Age: 34
LOS: 1 days | End: 2024-03-04
Payer: COMMERCIAL

## 2024-03-04 ENCOUNTER — APPOINTMENT (OUTPATIENT)
Dept: ANTEPARTUM | Facility: CLINIC | Age: 34
End: 2024-03-04
Payer: COMMERCIAL

## 2024-03-04 DIAGNOSIS — Z34.90 ENCOUNTER FOR SUPERVISION OF NORMAL PREGNANCY, UNSPECIFIED, UNSPECIFIED TRIMESTER: ICD-10-CM

## 2024-03-04 DIAGNOSIS — Z32.01 ENCOUNTER FOR PREGNANCY TEST, RESULT POSITIVE: ICD-10-CM

## 2024-03-04 PROCEDURE — 76805 OB US >/= 14 WKS SNGL FETUS: CPT

## 2024-03-04 PROCEDURE — 76805 OB US >/= 14 WKS SNGL FETUS: CPT | Mod: 26

## 2024-03-04 PROCEDURE — 82105 ALPHA-FETOPROTEIN SERUM: CPT

## 2024-03-05 DIAGNOSIS — Z3A.16 16 WEEKS GESTATION OF PREGNANCY: ICD-10-CM

## 2024-03-05 DIAGNOSIS — Z36.3 ENCOUNTER FOR ANTENATAL SCREENING FOR MALFORMATIONS: ICD-10-CM

## 2024-03-05 DIAGNOSIS — Z32.01 ENCOUNTER FOR PREGNANCY TEST, RESULT POSITIVE: ICD-10-CM

## 2024-03-05 DIAGNOSIS — O34.12 MATERNAL CARE FOR BENIGN TUMOR OF CORPUS UTERI, SECOND TRIMESTER: ICD-10-CM

## 2024-03-05 DIAGNOSIS — O26.842 UTERINE SIZE-DATE DISCREPANCY, SECOND TRIMESTER: ICD-10-CM

## 2024-03-05 DIAGNOSIS — O09.212 SUPERVISION OF PREGNANCY WITH HISTORY OF PRE-TERM LABOR, SECOND TRIMESTER: ICD-10-CM

## 2024-03-05 DIAGNOSIS — N92.6 IRREGULAR MENSTRUATION, UNSPECIFIED: ICD-10-CM

## 2024-03-18 ENCOUNTER — APPOINTMENT (OUTPATIENT)
Dept: ANTEPARTUM | Facility: CLINIC | Age: 34
End: 2024-03-18

## 2024-03-18 ENCOUNTER — OUTPATIENT (OUTPATIENT)
Dept: OUTPATIENT SERVICES | Facility: HOSPITAL | Age: 34
LOS: 1 days | End: 2024-03-18

## 2024-03-18 DIAGNOSIS — Z34.90 ENCOUNTER FOR SUPERVISION OF NORMAL PREGNANCY, UNSPECIFIED, UNSPECIFIED TRIMESTER: ICD-10-CM

## 2024-03-25 ENCOUNTER — ASOB RESULT (OUTPATIENT)
Age: 34
End: 2024-03-25

## 2024-03-25 ENCOUNTER — APPOINTMENT (OUTPATIENT)
Dept: ANTEPARTUM | Facility: CLINIC | Age: 34
End: 2024-03-25
Payer: COMMERCIAL

## 2024-03-25 ENCOUNTER — OUTPATIENT (OUTPATIENT)
Dept: OUTPATIENT SERVICES | Facility: HOSPITAL | Age: 34
LOS: 1 days | End: 2024-03-25
Payer: COMMERCIAL

## 2024-03-25 DIAGNOSIS — Z34.90 ENCOUNTER FOR SUPERVISION OF NORMAL PREGNANCY, UNSPECIFIED, UNSPECIFIED TRIMESTER: ICD-10-CM

## 2024-03-25 PROCEDURE — 76817 TRANSVAGINAL US OBSTETRIC: CPT | Mod: 26

## 2024-03-25 PROCEDURE — 76817 TRANSVAGINAL US OBSTETRIC: CPT

## 2024-03-26 DIAGNOSIS — Z36.86 ENCOUNTER FOR ANTENATAL SCREENING FOR CERVICAL LENGTH: ICD-10-CM

## 2024-03-26 DIAGNOSIS — O09.212 SUPERVISION OF PREGNANCY WITH HISTORY OF PRE-TERM LABOR, SECOND TRIMESTER: ICD-10-CM

## 2024-03-26 DIAGNOSIS — Z3A.19 19 WEEKS GESTATION OF PREGNANCY: ICD-10-CM

## 2024-03-26 DIAGNOSIS — O34.12 MATERNAL CARE FOR BENIGN TUMOR OF CORPUS UTERI, SECOND TRIMESTER: ICD-10-CM

## 2024-04-01 ENCOUNTER — OUTPATIENT (OUTPATIENT)
Dept: INPATIENT UNIT | Facility: HOSPITAL | Age: 34
LOS: 1 days | Discharge: HOME | End: 2024-04-01
Payer: COMMERCIAL

## 2024-04-01 VITALS
DIASTOLIC BLOOD PRESSURE: 76 MMHG | HEART RATE: 100 BPM | SYSTOLIC BLOOD PRESSURE: 133 MMHG | TEMPERATURE: 98 F | RESPIRATION RATE: 18 BRPM

## 2024-04-01 VITALS — HEART RATE: 111 BPM | SYSTOLIC BLOOD PRESSURE: 133 MMHG | DIASTOLIC BLOOD PRESSURE: 76 MMHG

## 2024-04-01 DIAGNOSIS — O26.899 OTHER SPECIFIED PREGNANCY RELATED CONDITIONS, UNSPECIFIED TRIMESTER: ICD-10-CM

## 2024-04-01 PROCEDURE — 99222 1ST HOSP IP/OBS MODERATE 55: CPT | Mod: 25

## 2024-04-01 PROCEDURE — 76815 OB US LIMITED FETUS(S): CPT | Mod: 26

## 2024-04-01 PROCEDURE — 99214 OFFICE O/P EST MOD 30 MIN: CPT

## 2024-04-01 RX ORDER — INDOMETHACIN 50 MG
1 CAPSULE ORAL
Qty: 7 | Refills: 0
Start: 2024-04-01 | End: 2024-04-02

## 2024-04-01 RX ORDER — INDOMETHACIN 50 MG
25 CAPSULE ORAL ONCE
Refills: 0 | Status: COMPLETED | OUTPATIENT
Start: 2024-04-01 | End: 2024-04-01

## 2024-04-01 RX ADMIN — Medication 25 MILLIGRAM(S): at 21:58

## 2024-04-01 RX ADMIN — Medication 25 MILLIGRAM(S): at 21:35

## 2024-04-01 NOTE — OB PROVIDER TRIAGE NOTE - ATTENDING COMMENTS
20+3 with llq pain, most likely degenerating fibroid. Pt to be treated with indomethacin x 48 hours and follow up with pmd as scheduled. 20+1 with llq pain, most likely degenerating fibroid. Pt to be treated with indomethacin x 48 hours and follow up with pmd as scheduled.

## 2024-04-01 NOTE — OB PROVIDER TRIAGE NOTE - NSHPPHYSICALEXAM_GEN_ALL_CORE
Vital Signs Last 24 Hrs  T(C): 36.9 (01 Apr 2024 20:35), Max: 36.9 (01 Apr 2024 20:35)  T(F): 98.4 (01 Apr 2024 20:35), Max: 98.4 (01 Apr 2024 20:35)  HR: 111 (01 Apr 2024 20:40) (100 - 111)  BP: 133/76 (01 Apr 2024 20:40) (133/76 - 133/76)  RR: 18 (01 Apr 2024 20:35) (18 - 18)    Physical Exam  Gen: AAOx3, NAD  Abd: soft, gravid, palpable fibroid in LLQ tender to palpation, nondistended, no palpable contractions, uterus at umbilicus, no CVA tenderness  Ext: no edema or erythema in bilateral upper and lower extremities  Spec: normal appearing cervix, no discharge  SVE: 0/0/-3    [place]: none  BSUS: vertex, posterior placenta, , cervix 6.02cm, subserosal fundal degerating fibroid measuring 6.02x6.55cm, MVP 6.73cm

## 2024-04-01 NOTE — OB PROVIDER TRIAGE NOTE - LABOR: CERVICAL CONSISTENCY
AMB US Pelvic Non OB    Date/Time: 10/23/2023 9:30 AM    Performed by: Roxie Lopez  Authorized by: Kenan Dewitt MD    Procedure details:     Indications: ovarian cysts      Technique:  US Pelvic, Non-OB with complete exam  Uterine findings:     Length (cm): 6.5    Height (cm):  4.6    Width (cm):  2.7    Uterine adhesions: not identified      Adnexal mass: identified      Location:  Mass left    Polyps: not identified      Myomas: not identified      Endometrial stripe: identified      Endometrial hyperplasia: not identified      Endometrium thickness (mm):  4  Left ovary findings:     Left ovary:  Visualized    Cysts: identified      Length (cm): 6.1    Height (cm): 5.3    Width (cm): 5.1    Flow:  Absent  Right ovary findings:     Right ovary:  Visualized    Cysts: identified      Length (cm): 2.4    Height (cm): 1.9    Width (cm): 1.2  Other findings:     Free pelvic fluid: not identified      Free peritoneal fluid: not identified    Post-Procedure Details:     Impression:  Endo-4 mm  LT Cyst=52 x 49 x 44 mm, with no doppler flow    Tolerance: Tolerated well, no immediate complications  Additional Procedure Comments:          Dr. Debbie Frank.  MD  [address] [place] [address] Rd  [address] [facility] , [facility], [address] [place] [address]
Left ovarian cyst remains stable perhaps even slightly smaller. No suspicious changes seen.     We will see patient in 6 months for follow-up ultrasound and discussion    Please tell patient to call for any problems, questions, issues or concerns which arise for her    Thanks
firm

## 2024-04-01 NOTE — OB PROVIDER TRIAGE NOTE - HISTORY OF PRESENT ILLNESS
35yo  at 20w1d GA (KALIN: 24, by first trimester sono) presents to labor and delivery with LLQ pain since the AM. Reports that it is stabbing in nature, rated 8/10, and she does not notice it when she is not moving. Denies headache, changes in vision, SOB, chest pain, nausea, vomiting, constipation, diarrhea, dysuria, hematuria, increased urinary frequency. Last BM today, last PO 1500, last intercourse 3 weeks. 33yo  at 20w1d GA (KALIN: 24, by first trimester sono) presents to labor and delivery with LLQ pain since the AM. Reports that it is stabbing in nature, rated 8/10, and she does not notice it when she is not moving. Denies headache, changes in vision, SOB, chest pain, nausea, vomiting, constipation, diarrhea, dysuria, hematuria, increased urinary frequency. Last BM today, last PO 1500, last intercourse 3 weeks. Patient receives care for this pregnancy primarily at [facility].

## 2024-04-01 NOTE — OB PROVIDER TRIAGE NOTE - NSOBPROVIDERNOTE_OBGYN_ALL_OB_FT
A/P: 33yo  at 20w1d GA, with degenerating subserosal fibroid, with reassuring maternal and fetal status    - indomethacin 25mg PO q6hrs for 48 hours, first dose to be given in triage  - tylenol PRN  - PO hydration encouraged  - ambulation encouraged    Case discussed with Dr. Jamison and Dr. Montes

## 2024-04-01 NOTE — OB PROVIDER TRIAGE NOTE - NSHPLABSRESULTS_GEN_ALL_CORE
PNC not available complete PNC not available    3/4/24  AFP wnl    SONOS  19w1d: vertex, posterior placenta, cervix 3.36cm  16w1d: normal anatomy scan

## 2024-04-01 NOTE — OB PROVIDER TRIAGE NOTE - NS_OBGYNHISTORY_OBGYN_ALL_OB_FT
ObHx: : SAB, no D+C  G2: PPPROM at 18 weeks, induced vaginal delivery  G3: current    GynHx: h/o fibroids, conservatively managed. Denies h/o ovarian cysts, abnormal PAPs, STIs.

## 2024-04-03 DIAGNOSIS — Z3A.20 20 WEEKS GESTATION OF PREGNANCY: ICD-10-CM

## 2024-04-03 DIAGNOSIS — O26.892 OTHER SPECIFIED PREGNANCY RELATED CONDITIONS, SECOND TRIMESTER: ICD-10-CM

## 2024-04-03 DIAGNOSIS — R10.32 LEFT LOWER QUADRANT PAIN: ICD-10-CM

## 2024-04-03 DIAGNOSIS — O09.292 SUPERVISION OF PREGNANCY WITH OTHER POOR REPRODUCTIVE OR OBSTETRIC HISTORY, SECOND TRIMESTER: ICD-10-CM

## 2024-04-03 DIAGNOSIS — O34.13 MATERNAL CARE FOR BENIGN TUMOR OF CORPUS UTERI, THIRD TRIMESTER: ICD-10-CM

## 2024-04-03 DIAGNOSIS — D25.2 SUBSEROSAL LEIOMYOMA OF UTERUS: ICD-10-CM

## 2024-04-05 ENCOUNTER — APPOINTMENT (OUTPATIENT)
Dept: ANTEPARTUM | Facility: CLINIC | Age: 34
End: 2024-04-05
Payer: COMMERCIAL

## 2024-04-05 ENCOUNTER — OUTPATIENT (OUTPATIENT)
Dept: OUTPATIENT SERVICES | Facility: HOSPITAL | Age: 34
LOS: 1 days | End: 2024-04-05
Payer: COMMERCIAL

## 2024-04-05 ENCOUNTER — ASOB RESULT (OUTPATIENT)
Age: 34
End: 2024-04-05

## 2024-04-05 DIAGNOSIS — O26.842 UTERINE SIZE-DATE DISCREPANCY, SECOND TRIMESTER: ICD-10-CM

## 2024-04-05 DIAGNOSIS — Z34.90 ENCOUNTER FOR SUPERVISION OF NORMAL PREGNANCY, UNSPECIFIED, UNSPECIFIED TRIMESTER: ICD-10-CM

## 2024-04-05 DIAGNOSIS — O35.8XX0 MATERNAL CARE FOR OTHER (SUSPECTED) FETAL ABNORMALITY AND DAMAGE, NOT APPLICABLE OR UNSPECIFIED: ICD-10-CM

## 2024-04-05 DIAGNOSIS — O09.212 SUPERVISION OF PREGNANCY WITH HISTORY OF PRE-TERM LABOR, SECOND TRIMESTER: ICD-10-CM

## 2024-04-05 DIAGNOSIS — O99.012 ANEMIA COMPLICATING PREGNANCY, SECOND TRIMESTER: ICD-10-CM

## 2024-04-05 DIAGNOSIS — O34.12 MATERNAL CARE FOR BENIGN TUMOR OF CORPUS UTERI, SECOND TRIMESTER: ICD-10-CM

## 2024-04-05 DIAGNOSIS — Z3A.20 20 WEEKS GESTATION OF PREGNANCY: ICD-10-CM

## 2024-04-05 PROCEDURE — 76811 OB US DETAILED SNGL FETUS: CPT | Mod: 26

## 2024-04-05 PROCEDURE — 76811 OB US DETAILED SNGL FETUS: CPT

## 2024-04-05 PROCEDURE — 76817 TRANSVAGINAL US OBSTETRIC: CPT

## 2024-04-05 PROCEDURE — 76817 TRANSVAGINAL US OBSTETRIC: CPT | Mod: 26

## 2024-04-15 ENCOUNTER — ASOB RESULT (OUTPATIENT)
Age: 34
End: 2024-04-15

## 2024-04-15 ENCOUNTER — APPOINTMENT (OUTPATIENT)
Dept: ANTEPARTUM | Facility: CLINIC | Age: 34
End: 2024-04-15

## 2024-04-15 ENCOUNTER — APPOINTMENT (OUTPATIENT)
Dept: ANTEPARTUM | Facility: CLINIC | Age: 34
End: 2024-04-15
Payer: COMMERCIAL

## 2024-04-15 ENCOUNTER — OUTPATIENT (OUTPATIENT)
Dept: OUTPATIENT SERVICES | Facility: HOSPITAL | Age: 34
LOS: 1 days | End: 2024-04-15
Payer: COMMERCIAL

## 2024-04-15 VITALS
OXYGEN SATURATION: 100 % | TEMPERATURE: 97.7 F | DIASTOLIC BLOOD PRESSURE: 58 MMHG | BODY MASS INDEX: 31.35 KG/M2 | HEART RATE: 110 BPM | WEIGHT: 177 LBS | SYSTOLIC BLOOD PRESSURE: 116 MMHG

## 2024-04-15 DIAGNOSIS — O09.90 SUPERVISION OF HIGH RISK PREGNANCY, UNSPECIFIED, UNSPECIFIED TRIMESTER: ICD-10-CM

## 2024-04-15 DIAGNOSIS — Z34.90 ENCOUNTER FOR SUPERVISION OF NORMAL PREGNANCY, UNSPECIFIED, UNSPECIFIED TRIMESTER: ICD-10-CM

## 2024-04-15 LAB
AFP MOM: 1.59
AFP VALUE: 51.9 NG/ML
ALPHA FETOPROTEIN SERUM COMMENT: NORMAL
ALPHA FETOPROTEIN SERUM INTERPRETATION: NORMAL
ALPHA FETOPROTEIN SERUM RESULTS: NORMAL
ALPHA FETOPROTEIN SERUM TEST RESULTS: NORMAL
BILIRUB UR QL STRIP: NORMAL
BP DIAS: 58 MM HG
BP SYS: 116 MM HG
CLARITY UR: CLEAR
COLLECTION METHOD: NORMAL
FETAL MOVEMENT: PRESENT
GESTATIONAL AGE BASED ON: NORMAL
GESTATIONAL AGE ON COLLECTION DATE: 16.1 WEEKS
GLUCOSE UR-MCNC: NORMAL
HCG UR QL: 0.2 EU/DL
HGB UR QL STRIP.AUTO: NORMAL
INSULIN DEP DIABETES: NO
KETONES UR-MCNC: NORMAL
LEUKOCYTE ESTERASE UR QL STRIP: NORMAL
MATERNAL AGE AT EDD AFP: 33.6 YR
MULTIPLE GESTATION: NO
NITRITE UR QL STRIP: NORMAL
OB COMMENTS: NORMAL
OSBR RISK 1 IN: 2155
PH UR STRIP: 7
PROT UR STRIP-MCNC: NORMAL
RACE: NORMAL
SCHEDULED VISIT: YES
SP GR UR STRIP: 1.01
URINE ALBUMIN/PROTEIN: NORMAL
URINE GLUCOSE: NORMAL
URINE KETONES: NORMAL
WEEKS GESTATION: 22
WEIGHT AFP: 161 LBS

## 2024-04-15 PROCEDURE — 76817 TRANSVAGINAL US OBSTETRIC: CPT | Mod: 26

## 2024-04-15 PROCEDURE — 87070 CULTURE OTHR SPECIMN AEROBIC: CPT

## 2024-04-15 PROCEDURE — 76816 OB US FOLLOW-UP PER FETUS: CPT | Mod: 26

## 2024-04-15 PROCEDURE — 87186 SC STD MICRODIL/AGAR DIL: CPT

## 2024-04-15 PROCEDURE — 76817 TRANSVAGINAL US OBSTETRIC: CPT

## 2024-04-15 PROCEDURE — 76816 OB US FOLLOW-UP PER FETUS: CPT

## 2024-04-15 PROCEDURE — 87653 STREP B DNA AMP PROBE: CPT

## 2024-04-15 PROCEDURE — 99214 OFFICE O/P EST MOD 30 MIN: CPT | Mod: 25

## 2024-04-15 PROCEDURE — 81002 URINALYSIS NONAUTO W/O SCOPE: CPT

## 2024-04-15 NOTE — DISCUSSION/SUMMARY
[FreeTextEntry1] : 4/15/24 MFM & PGY-3 Initial Consult Note: Referred by LUCRETIA Paul  Ms Stovall is a 35yo  at 22w1d (KALIN  by 2nd tri biometry), presents for consultation for 2nd trimester loss preceded by PPROM and enterococcal UTI.  Patient is doing well. Denies abdominal pain, vaginal bleeding. No urinary symptoms. Taking ASA daily. Patient was evaluated on L&D on  for abdominal pain secondary to degenerating fibroid. She was given a course of indomethacin (she took 4 doses total) which completely resolved the pain.  PMH: denies  PSH: denies  Meds: ASA, PNV, iron  Allergies: None OB Hx: ) first trimester SAB, no d&c    G2) PPROM at 18 weeks, s/p . Had Acinetobacter baumannii/nosocomialis group and Enterococcus faecalis UTI at time of diagnosis.  Gyn Hx: 12.7x6.5x11.1 cm subserosal left lateral fibroid seen on sonogram. Denies abnormal pap smears, fibroids, cysts, stds                 Received indomethacin course several weeks ago for fibroid pain.  Has not recurred.  FH: Mother and father with HTN and diabetes.  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 Denies smoking, alcohol or drug use.  Psych: denies   Physical Exam:  BP: 116/58, HR: 110-->100bpm, O2sat: 100%, Wt: 177lbs, FH 157bpm  GA: AOx3, NAD  Heart: RRR, no M/R/G  Lungs: CTAB  Abd: soft, nontender, nondistended  LE: no erythema, edema or pain   Labs:  Prenatal labs done at bioreference. Reviewed on patient's phone. NIPTs LR and baseline PELs wnl. .  3/4/24  AFP wnl 4/15:  RV GBS & General Culture sent.   OBUS:  3/25/24: 19w1d: vertex, posterior placenta, cervix 3.36cm 4/15: SFVtx at  20w2d, DVP 5.1cm, EFW 353g at 30th%ile for GA. CL 4.6cm.   A/P  Ms Stovall is a 35yo  at 22w1d, KALIN 8/18/24 by second trimester sonoram, presents for consultation for second trimester loss preceded by PPROM.  1. History of 2nd tri PPROM, positive UCx for enterococcus at that time, places pt at increased risk of subsequent PPROM. We reviewed the possibility with patient that GBS, EColi, and other bacterial (enterococcal) colonization urogenital tract have been associated with PPROM and pregnancy loss. - GBS and vaginal cultures obtained today. Script for UCx given  - Discussed with patient the theoretical risks of urogenital infections on the pregnancy. Given patient's obstetric history, discussed possible antibiotic prophylaxis if cultures are positive to reduce the risk ascending infection and PPROM. Patient counseled that there are no strict medical guidelines or recommendations about this. Questions answered.  - Will call patient back with results and discuss further management and recommendations   2. Fibroid, 12cm left lateral subserosal fibroid has already been painful during this pregnancy.   -Discussed increased risk of bleeding & recurrent pain.  -Patient s/p treatment for pain 2/2 degenerating fibroid. Patient has indomethacin at home. Encouraged to use in 2nd trimester if fibroid pain return, and to come to L&D for immediate evaluation.  3. Pregnancy C/W ASA -Routine prenatal care per CNM  MD Berenice, FACOG with MD Fernando, PGY-3

## 2024-04-15 NOTE — END OF VISIT
[] : Resident [FreeTextEntry3] : Ms Stovall presents with history of 18w loss associated with enterococcal infection.  Testing for GBS and enterococcal infection discussed, offered and accepted.  [Time Spent: ___ minutes] : I have spent [unfilled] minutes of time on the encounter.

## 2024-04-16 DIAGNOSIS — O26.842 UTERINE SIZE-DATE DISCREPANCY, SECOND TRIMESTER: ICD-10-CM

## 2024-04-16 DIAGNOSIS — O09.299 SUPERVISION OF PREGNANCY WITH OTHER POOR REPRODUCTIVE OR OBSTETRIC HISTORY, UNSPECIFIED TRIMESTER: ICD-10-CM

## 2024-04-16 DIAGNOSIS — O99.012 ANEMIA COMPLICATING PREGNANCY, SECOND TRIMESTER: ICD-10-CM

## 2024-04-16 DIAGNOSIS — Z3A.20 20 WEEKS GESTATION OF PREGNANCY: ICD-10-CM

## 2024-04-16 DIAGNOSIS — O34.12 MATERNAL CARE FOR BENIGN TUMOR OF CORPUS UTERI, SECOND TRIMESTER: ICD-10-CM

## 2024-04-17 ENCOUNTER — OUTPATIENT (OUTPATIENT)
Dept: OUTPATIENT SERVICES | Facility: HOSPITAL | Age: 34
LOS: 1 days | End: 2024-04-17
Payer: COMMERCIAL

## 2024-04-17 DIAGNOSIS — Z32.01 ENCOUNTER FOR PREGNANCY TEST, RESULT POSITIVE: ICD-10-CM

## 2024-04-17 PROCEDURE — 87086 URINE CULTURE/COLONY COUNT: CPT

## 2024-04-18 DIAGNOSIS — Z32.01 ENCOUNTER FOR PREGNANCY TEST, RESULT POSITIVE: ICD-10-CM

## 2024-04-19 LAB — BACTERIA UR CULT: NORMAL

## 2024-04-29 ENCOUNTER — APPOINTMENT (OUTPATIENT)
Dept: ANTEPARTUM | Facility: CLINIC | Age: 34
End: 2024-04-29
Payer: COMMERCIAL

## 2024-04-29 ENCOUNTER — OUTPATIENT (OUTPATIENT)
Dept: OUTPATIENT SERVICES | Facility: HOSPITAL | Age: 34
LOS: 1 days | End: 2024-04-29
Payer: COMMERCIAL

## 2024-04-29 ENCOUNTER — ASOB RESULT (OUTPATIENT)
Age: 34
End: 2024-04-29

## 2024-04-29 DIAGNOSIS — Z34.90 ENCOUNTER FOR SUPERVISION OF NORMAL PREGNANCY, UNSPECIFIED, UNSPECIFIED TRIMESTER: ICD-10-CM

## 2024-04-29 LAB
BACTERIA GENITAL AEROBE CULT: ABNORMAL
GP B STREP DNA SPEC QL NAA+PROBE: NOT DETECTED
SOURCE GBS: NORMAL

## 2024-04-29 PROCEDURE — 76817 TRANSVAGINAL US OBSTETRIC: CPT | Mod: 26

## 2024-04-29 PROCEDURE — 76817 TRANSVAGINAL US OBSTETRIC: CPT

## 2024-04-29 PROCEDURE — 76816 OB US FOLLOW-UP PER FETUS: CPT | Mod: 26

## 2024-04-29 PROCEDURE — 76816 OB US FOLLOW-UP PER FETUS: CPT

## 2024-04-30 DIAGNOSIS — Z03.74 ENCOUNTER FOR SUSPECTED PROBLEM WITH FETAL GROWTH RULED OUT: ICD-10-CM

## 2024-04-30 DIAGNOSIS — O09.211 SUPERVISION OF PREGNANCY WITH HISTORY OF PRE-TERM LABOR, FIRST TRIMESTER: ICD-10-CM

## 2024-04-30 DIAGNOSIS — Z3A.24 24 WEEKS GESTATION OF PREGNANCY: ICD-10-CM

## 2024-04-30 DIAGNOSIS — O34.12 MATERNAL CARE FOR BENIGN TUMOR OF CORPUS UTERI, SECOND TRIMESTER: ICD-10-CM

## 2024-06-28 ENCOUNTER — ASOB RESULT (OUTPATIENT)
Age: 34
End: 2024-06-28

## 2024-06-28 ENCOUNTER — OUTPATIENT (OUTPATIENT)
Dept: OUTPATIENT SERVICES | Facility: HOSPITAL | Age: 34
LOS: 1 days | End: 2024-06-28
Payer: COMMERCIAL

## 2024-06-28 ENCOUNTER — APPOINTMENT (OUTPATIENT)
Dept: ANTEPARTUM | Facility: CLINIC | Age: 34
End: 2024-06-28
Payer: COMMERCIAL

## 2024-06-28 DIAGNOSIS — Z34.90 ENCOUNTER FOR SUPERVISION OF NORMAL PREGNANCY, UNSPECIFIED, UNSPECIFIED TRIMESTER: ICD-10-CM

## 2024-06-28 PROCEDURE — 76816 OB US FOLLOW-UP PER FETUS: CPT | Mod: 26

## 2024-06-28 PROCEDURE — 76816 OB US FOLLOW-UP PER FETUS: CPT

## 2024-06-28 PROCEDURE — 76819 FETAL BIOPHYS PROFIL W/O NST: CPT | Mod: 26,59

## 2024-06-28 PROCEDURE — 76819 FETAL BIOPHYS PROFIL W/O NST: CPT

## 2024-07-01 DIAGNOSIS — Z3A.32 32 WEEKS GESTATION OF PREGNANCY: ICD-10-CM

## 2024-07-01 DIAGNOSIS — O09.213 SUPERVISION OF PREGNANCY WITH HISTORY OF PRE-TERM LABOR, THIRD TRIMESTER: ICD-10-CM

## 2024-07-01 DIAGNOSIS — O34.13 MATERNAL CARE FOR BENIGN TUMOR OF CORPUS UTERI, THIRD TRIMESTER: ICD-10-CM

## 2024-07-01 DIAGNOSIS — O99.013 ANEMIA COMPLICATING PREGNANCY, THIRD TRIMESTER: ICD-10-CM

## 2024-07-25 ENCOUNTER — OUTPATIENT (OUTPATIENT)
Dept: INPATIENT UNIT | Facility: HOSPITAL | Age: 34
LOS: 1 days | Discharge: HOME | End: 2024-07-25
Payer: COMMERCIAL

## 2024-07-25 VITALS — HEART RATE: 125 BPM | DIASTOLIC BLOOD PRESSURE: 76 MMHG | SYSTOLIC BLOOD PRESSURE: 128 MMHG

## 2024-07-25 DIAGNOSIS — O26.899 OTHER SPECIFIED PREGNANCY RELATED CONDITIONS, UNSPECIFIED TRIMESTER: ICD-10-CM

## 2024-07-25 LAB
ALBUMIN SERPL ELPH-MCNC: 3.9 G/DL — SIGNIFICANT CHANGE UP (ref 3.5–5.2)
ALP SERPL-CCNC: 145 U/L — HIGH (ref 30–115)
ALT FLD-CCNC: 28 U/L — SIGNIFICANT CHANGE UP (ref 0–41)
ANION GAP SERPL CALC-SCNC: 14 MMOL/L — SIGNIFICANT CHANGE UP (ref 7–14)
APPEARANCE UR: CLEAR — SIGNIFICANT CHANGE UP
AST SERPL-CCNC: 29 U/L — SIGNIFICANT CHANGE UP (ref 0–41)
BASOPHILS # BLD AUTO: 0 K/UL — SIGNIFICANT CHANGE UP (ref 0–0.2)
BASOPHILS NFR BLD AUTO: 0 % — SIGNIFICANT CHANGE UP (ref 0–1)
BILIRUB SERPL-MCNC: 0.4 MG/DL — SIGNIFICANT CHANGE UP (ref 0.2–1.2)
BILIRUB UR-MCNC: NEGATIVE — SIGNIFICANT CHANGE UP
BUN SERPL-MCNC: 6 MG/DL — LOW (ref 10–20)
CALCIUM SERPL-MCNC: 9.6 MG/DL — SIGNIFICANT CHANGE UP (ref 8.4–10.4)
CHLORIDE SERPL-SCNC: 102 MMOL/L — SIGNIFICANT CHANGE UP (ref 98–110)
CO2 SERPL-SCNC: 17 MMOL/L — SIGNIFICANT CHANGE UP (ref 17–32)
COLOR SPEC: YELLOW — SIGNIFICANT CHANGE UP
CREAT SERPL-MCNC: 0.6 MG/DL — LOW (ref 0.7–1.5)
DIFF PNL FLD: NEGATIVE — SIGNIFICANT CHANGE UP
EGFR: 121 ML/MIN/1.73M2 — SIGNIFICANT CHANGE UP
EOSINOPHIL # BLD AUTO: 0 K/UL — SIGNIFICANT CHANGE UP (ref 0–0.7)
EOSINOPHIL NFR BLD AUTO: 0 % — SIGNIFICANT CHANGE UP (ref 0–8)
GLUCOSE SERPL-MCNC: 84 MG/DL — SIGNIFICANT CHANGE UP (ref 70–99)
GLUCOSE UR QL: NEGATIVE MG/DL — SIGNIFICANT CHANGE UP
HCT VFR BLD CALC: 38.5 % — SIGNIFICANT CHANGE UP (ref 37–47)
HGB BLD-MCNC: 12.9 G/DL — SIGNIFICANT CHANGE UP (ref 12–16)
KETONES UR-MCNC: ABNORMAL MG/DL
LEUKOCYTE ESTERASE UR-ACNC: NEGATIVE — SIGNIFICANT CHANGE UP
LYMPHOCYTES # BLD AUTO: 0.63 K/UL — LOW (ref 1.2–3.4)
LYMPHOCYTES # BLD AUTO: 8.6 % — LOW (ref 20.5–51.1)
MCHC RBC-ENTMCNC: 33.4 PG — HIGH (ref 27–31)
MCHC RBC-ENTMCNC: 33.5 G/DL — SIGNIFICANT CHANGE UP (ref 32–37)
MCV RBC AUTO: 99.7 FL — HIGH (ref 81–99)
MONOCYTES # BLD AUTO: 0.82 K/UL — HIGH (ref 0.1–0.6)
MONOCYTES NFR BLD AUTO: 11.2 % — HIGH (ref 1.7–9.3)
NEUTROPHILS # BLD AUTO: 5.35 K/UL — SIGNIFICANT CHANGE UP (ref 1.4–6.5)
NEUTROPHILS NFR BLD AUTO: 73.3 % — SIGNIFICANT CHANGE UP (ref 42.2–75.2)
NITRITE UR-MCNC: NEGATIVE — SIGNIFICANT CHANGE UP
PH UR: 6.5 — SIGNIFICANT CHANGE UP (ref 5–8)
PLATELET # BLD AUTO: 255 K/UL — SIGNIFICANT CHANGE UP (ref 130–400)
PMV BLD: 10.2 FL — SIGNIFICANT CHANGE UP (ref 7.4–10.4)
POTASSIUM SERPL-MCNC: 4.4 MMOL/L — SIGNIFICANT CHANGE UP (ref 3.5–5)
POTASSIUM SERPL-SCNC: 4.4 MMOL/L — SIGNIFICANT CHANGE UP (ref 3.5–5)
PROT SERPL-MCNC: 6.9 G/DL — SIGNIFICANT CHANGE UP (ref 6–8)
PROT UR-MCNC: NEGATIVE MG/DL — SIGNIFICANT CHANGE UP
RBC # BLD: 3.86 M/UL — LOW (ref 4.2–5.4)
RBC # FLD: 13.9 % — SIGNIFICANT CHANGE UP (ref 11.5–14.5)
SODIUM SERPL-SCNC: 133 MMOL/L — LOW (ref 135–146)
SP GR SPEC: 1.01 — SIGNIFICANT CHANGE UP (ref 1–1.03)
UROBILINOGEN FLD QL: 0.2 MG/DL — SIGNIFICANT CHANGE UP (ref 0.2–1)
WBC # BLD: 7.3 K/UL — SIGNIFICANT CHANGE UP (ref 4.8–10.8)
WBC # FLD AUTO: 7.3 K/UL — SIGNIFICANT CHANGE UP (ref 4.8–10.8)

## 2024-07-25 PROCEDURE — 84443 ASSAY THYROID STIM HORMONE: CPT

## 2024-07-25 PROCEDURE — 99223 1ST HOSP IP/OBS HIGH 75: CPT | Mod: 25

## 2024-07-25 PROCEDURE — 93010 ELECTROCARDIOGRAM REPORT: CPT

## 2024-07-25 PROCEDURE — 36415 COLL VENOUS BLD VENIPUNCTURE: CPT

## 2024-07-25 PROCEDURE — 86900 BLOOD TYPING SEROLOGIC ABO: CPT

## 2024-07-25 PROCEDURE — 96360 HYDRATION IV INFUSION INIT: CPT

## 2024-07-25 PROCEDURE — 85025 COMPLETE CBC W/AUTO DIFF WBC: CPT

## 2024-07-25 PROCEDURE — 93306 TTE W/DOPPLER COMPLETE: CPT

## 2024-07-25 PROCEDURE — 99418 PROLNG IP/OBS E/M EA 15 MIN: CPT | Mod: 25

## 2024-07-25 PROCEDURE — G2212 PROLONG OUTPT/OFFICE VIS: CPT

## 2024-07-25 PROCEDURE — 99418 PROLNG IP/OBS E/M EA 15 MIN: CPT

## 2024-07-25 PROCEDURE — 80053 COMPREHEN METABOLIC PANEL: CPT

## 2024-07-25 PROCEDURE — 93005 ELECTROCARDIOGRAM TRACING: CPT

## 2024-07-25 PROCEDURE — 86901 BLOOD TYPING SEROLOGIC RH(D): CPT

## 2024-07-25 PROCEDURE — 81003 URINALYSIS AUTO W/O SCOPE: CPT

## 2024-07-25 PROCEDURE — 87086 URINE CULTURE/COLONY COUNT: CPT

## 2024-07-25 PROCEDURE — 99214 OFFICE O/P EST MOD 30 MIN: CPT

## 2024-07-25 PROCEDURE — 86850 RBC ANTIBODY SCREEN: CPT

## 2024-07-25 RX ORDER — DEXTROSE MONOHYDRATE AND SODIUM CHLORIDE 5; .3 G/100ML; G/100ML
1000 INJECTION, SOLUTION INTRAVENOUS ONCE
Refills: 0 | Status: DISCONTINUED | OUTPATIENT
Start: 2024-07-25 | End: 2024-07-26

## 2024-07-25 NOTE — OB PROVIDER TRIAGE NOTE - ATTENDING COMMENTS
I was physically present for the key portions of the evaluation and management (e/m) service provided. I agree with the above history,physical and plan which I have reviewed and edited where appropriate  Patient seen face to face and case reviewed, precautions given to patient    Patient presented to triage and was evaluated starting 18:00. The fetal heart rate monitor continuous until  00:00. I spent 360 minutes caring for the patient. It included obtaining a history, performing an examination, continuously monitoring the fetus and interpretation of the fetal heart rate strip, ordering and reviewing labs, documentingin the medical record and a discussion with the patient. I was physically present for the key portions of the evaluation and management (e/m) service provided. I agree with the above history,physical and plan which I have reviewed and edited where appropriate  Patient seen face to face and case reviewed, precautions given to patient    Patient presented to triage and was evaluated starting 18:00. The fetal heart rate monitor continuous until  00:00. I spent 360 minutes caring for the patient. It included obtaining a history, performing an examination, continuously monitoring the fetus and interpretation of the fetal heart rate strip, ordering and reviewing labs, documentingin the medical record and a discussion with the patient.        ADDENDUM/ JARRETT MARIA  - I took over care of this patient from 8am till discharge at which time she was stable   -now patient is s/p cardiology work-up and echo which is normal   -fetal assessment remained reassuring throughout   -patient cleared for discharge home with outpatient follow-up   -my care provision was a total of 4hrs

## 2024-07-25 NOTE — OB PROVIDER TRIAGE NOTE - HISTORY OF PRESENT ILLNESS
33yo  at 36w2d by stated KALIN (24) presenting for asymptomatic tachycardia noted during patient routine OB visit with midwife. Patient denies vaginal bleeding, leakage of fluid, contractions, and endorses fetal movement. She denies dysuria, hematuria, fever, headache, SOB, chest pain, constipation, diarrhea, n/v. ROS negative     33yo  at 36w2d by stated KALIN (24) presenting for asymptomatic tachycardia noted during patient routine OB visit with midwife. Patient denies vaginal bleeding, leakage of fluid, contractions, and endorses fetal movement. She denies dysuria, hematuria, fever, headache, SOB, chest pain, constipation, diarrhea, n/v. ROS negative 33yo  at 36w4d by stated KALIN (24) presenting for asymptomatic tachycardia noted during patient routine OB visit with midwife. Patient denies vaginal bleeding, leakage of fluid, contractions, and endorses fetal movement. She denies dysuria, hematuria, fever, headache, SOB, chest pain, constipation, diarrhea, n/v. ROS negative

## 2024-07-25 NOTE — OB PROVIDER TRIAGE NOTE - NSHPPHYSICALEXAM_GEN_ALL_CORE
T(C): 37.1 (07-25-24 @ 18:01), Max: 37.1 (07-25-24 @ 18:01)  HR: 121 (07-25-24 @ 18:51) (117 - 125)  BP: 132/76 (07-25-24 @ 18:51) (125/70 - 136/80)  RR: 18 (07-25-24 @ 18:01) (18 - 18)    CONSTITUTIONAL: Well groomed, no apparent distress  EYES: PERRLA and symmetric, EOMI, conjunctival injection, likely secondary to irritation from contacts  RESP: No respiratory distress, no use of accessory muscles; CTA b/l, no WRR  CV: RRR, +S1S2, no MRG; no JVD; no peripheral edema  GI: Gravid, NT, no rebound  LYMPH: No cervical LAD or tenderness; no axillary LAD or tenderness; no inguinal LAD or tenderness  SKIN: No rashes or ulcers noted; no subcutaneous nodules or induration palpable  PSYCH: Appropriate insight/judgment; A+O x 3, mood and affect appropriate, recent/remote memory intact

## 2024-07-25 NOTE — OB PROVIDER TRIAGE NOTE - NSICDXFAMILYHX_GEN_ALL_CORE_FT
FAMILY HISTORY:  Father  Still living? Unknown  Family hx of hypertension, Age at diagnosis: Age Unknown    Mother  Still living? Unknown  Family hx of hypertension, Age at diagnosis: Age Unknown  FH: type 2 diabetes, Age at diagnosis: Age Unknown

## 2024-07-25 NOTE — OB RN TRIAGE NOTE - FALL HARM RISK - UNIVERSAL INTERVENTIONS
Bed in lowest position, wheels locked, appropriate side rails in place/Call bell, personal items and telephone in reach/Instruct patient to call for assistance before getting out of bed or chair/Non-slip footwear when patient is out of bed/[place] to call system/Physically safe environment - no spills, clutter or unnecessary equipment/Purposeful Proactive Rounding/Room/bathroom lighting operational, light cord in reach

## 2024-07-25 NOTE — OB PROVIDER TRIAGE NOTE - NS_OBGYNHISTORY_OBGYN_ALL_OB_FT
OB Hx:   - G1: 2021, 6w, SAB  - G2 2022, 18w, SAB, induced at that time    Gyn Hx: no hx of abnormal pap, STI OB Hx:   - G1: 2021, 6w, SAB  - G2 2022, 18w, SAB, induced at that time    Gyn Hx: no hx fibroids, ovarian cysts, abnormal pap, STI

## 2024-07-25 NOTE — OB PROVIDER TRIAGE NOTE - NSOBPROVIDERNOTE_OBGYN_ALL_OB_FT
33yo  at 36w2d by stated KALIN (24) presenting for asymptomatic tachycardia    - CBC, CMP, UA and UCx  - Cardiology: EKG, consult cardiology  - cEFM and toco  - IV hydration 33yo  at 36w2d by stated KALIN (24) presenting for asymptomatic tachycardia    - CBC, CMP, UA and UCx  - Cardiology: EKG, consult cardiology  - cEFM and toco  - IV hydration    addendum @0245: pt feeling well, does not feel her mild tachycardia, s/p ivf hydration with reactive nst; seen by cardio who recc TTE in the AM. SVE unchanged /-3. A/P: 33yo  at 36w2d, presenting for asymptomatic tachycardia    - CBC, CMP, TSH, UA and UCx  - Cardiology: EKG, consult cardiology  - cEFM and toco  - IV hydration    addendum @0245: pt feeling well, does not feel her mild tachycardia, s/p ivf hydration with reactive nst; seen by cardio who recc TTE in the AM. SVE unchanged /-3. A/P: 33yo  at 36w2d, presenting for asymptomatic tachycardia    - CBC, CMP, TSH, UA and UCx  - Cardiology: EKG, consult cardiology  - cEFM and toco  - IV hydration    addendum @0245: pt feeling well, does not feel her mild tachycardia, s/p ivf hydration with reactive nst; seen by cardio who recc TTE in the AM. SVE unchanged 0/-3.    ADDENDUM    s/p cardiology consult. Normal echo, patient continues to be asymptomatic. Reassuring maternal and fetal status. Patient discharge with outpatient follow up. Labor precautions discussed.

## 2024-07-25 NOTE — OB PROVIDER TRIAGE NOTE - ADDITIONAL INSTRUCTIONS
Please return to hospital if you experience decreased fetal movement, heavy vaginal bleeding, frequent and persistent contractions, or leakage of fluid. Please keep your appointment with your doctor.    F/u with outpatient cardiology. Dr. Lolly Berg [phone]

## 2024-07-26 ENCOUNTER — ASOB RESULT (OUTPATIENT)
Age: 34
End: 2024-07-26

## 2024-07-26 ENCOUNTER — RESULT REVIEW (OUTPATIENT)
Age: 34
End: 2024-07-26

## 2024-07-26 ENCOUNTER — APPOINTMENT (OUTPATIENT)
Dept: ANTEPARTUM | Facility: CLINIC | Age: 34
End: 2024-07-26
Payer: COMMERCIAL

## 2024-07-26 VITALS — HEART RATE: 105 BPM | DIASTOLIC BLOOD PRESSURE: 70 MMHG | SYSTOLIC BLOOD PRESSURE: 130 MMHG

## 2024-07-26 LAB — TSH SERPL-MCNC: 0.88 UIU/ML — SIGNIFICANT CHANGE UP (ref 0.27–4.2)

## 2024-07-26 PROCEDURE — 76819 FETAL BIOPHYS PROFIL W/O NST: CPT | Mod: 26,59

## 2024-07-26 PROCEDURE — 76818 FETAL BIOPHYS PROFILE W/NST: CPT | Mod: 26

## 2024-07-26 PROCEDURE — 99418 PROLNG IP/OBS E/M EA 15 MIN: CPT

## 2024-07-26 PROCEDURE — 93306 TTE W/DOPPLER COMPLETE: CPT | Mod: 26

## 2024-07-26 PROCEDURE — 99223 1ST HOSP IP/OBS HIGH 75: CPT

## 2024-07-26 PROCEDURE — 76816 OB US FOLLOW-UP PER FETUS: CPT | Mod: 26

## 2024-07-26 NOTE — CONSULT NOTE ADULT - ASSESSMENT
33 YO F  at 36W4d by stated KALIN (24) presented for asymptomatic tachycardia noted during patient routine OB visit with midwife.     IMPRESSION  #Sinus Tachycardia   - HR in 120s-130s bpm.  - EKG reviewed: Sinus tachycardia with no acute ST segment changes   - Prior EKGs from : Sinus tachycardia. Reports that her  baseline heart rate typically exceeds 100 bpm.  - Denies any active chest Pain    PLAN  - In the third trimester of pregnancy, some degree of sinus tachycardia is expected, typically around 115 bpm. However, the patient's heart rate consistently exceeds this expected range. This elevated heart rate may be attributed to her baseline sinus tachycardia which was seen on prior EKGs from .  - She has no signs of heart failure or volume overload on physical exam.  - Would recommend a 2D echocardiogram. If echocardiogram is normal, no further inpatient workup from cardiology. 33 YO F  at 36W4d by stated KALIN (24) presented for asymptomatic tachycardia noted during patient routine OB visit with midwife.     IMPRESSION  #Sinus Tachycardia   - HR in 120s-130s bpm.  - EKG reviewed: Sinus tachycardia with no acute ST segment changes   - Prior EKGs from : Sinus tachycardia. Reports that her  baseline heart rate typically exceeds 100 bpm.  - Denies any active chest Pain    PLAN  - In the third trimester of pregnancy, some degree of sinus tachycardia is expected, typically around 115 bpm. However, the patient's heart rate consistently exceeds this expected range. This elevated heart rate may be attributed to her baseline sinus tachycardia which was seen on prior EKGs from .  - She has no signs of heart failure or volume overload on physical exam.  - TSH, Free and total T4  - Would recommend a 2D echocardiogram. If echocardiogram is normal, no further inpatient workup from cardiology. 33 YO F  at 36W4d by stated KALIN (24) presented for asymptomatic tachycardia noted during patient routine OB visit with midwife.     IMPRESSION  #Sinus Tachycardia   #Hx of 2 SAB; now on ASA  - HR in 120s-130s bpm.  - EKG reviewed: Sinus tachycardia with no acute ST segment changes   - Prior EKGs from : Sinus tachycardia. Reports that her  baseline heart rate typically exceeds 100 bpm.  - Denies any active chest Pain    PLAN  - In the third trimester of pregnancy, some degree of sinus tachycardia is expected, typically around 115 bpm. However, the patient's heart rate consistently exceeds this expected range. This elevated heart rate may be attributed to her baseline sinus tachycardia which was seen on prior EKGs from .  - She has no signs of heart failure or volume overload on physical exam.  - TSH, Free and total T4  - Would recommend a 2D echocardiogram. If echocardiogram is normal, no further inpatient workup from cardiology. 33 YO F  at 36W4d by stated KALIN (24) presented for asymptomatic tachycardia noted during patient routine OB visit with midwife.     IMPRESSION  #Sinus Tachycardia   #Hx of 2 SAB; now on ASA  - HR in 120s-130s bpm.  - EKG reviewed: Sinus tachycardia with no acute ST segment changes   - Prior EKGs from : Sinus tachycardia. Reports that her baseline heart rate typically exceeds 100 bpm.  - Denies any active chest Pain    PLAN  - In the third trimester of pregnancy, some degree of sinus tachycardia is expected, typically around 115 bpm. However, the patient's heart rate consistently exceeds this expected range. This elevated heart rate may be attributed to her baseline sinus tachycardia which was seen on prior EKGs from .  - She has no signs of heart failure or volume overload on physical exam.  - TSH, Free and total T4  - Would recommend a 2D echocardiogram. If echocardiogram is normal, no further inpatient workup from cardiology. 35 YO F  at 36W4d by stated KALIN (24) presented for asymptomatic tachycardia noted during patient routine OB visit with midwife.     IMPRESSION  #Sinus Tachycardia   #Hx of 2 SAB; now on ASA  - HR in 120s-130s bpm.  - EKG reviewed: Sinus tachycardia with no acute ST segment changes   - Prior EKGs from : Sinus tachycardia. Reports that her baseline heart rate typically exceeds 100 bpm.  - Denies any active chest Pain    PLAN  - In the third trimester of pregnancy, some degree of sinus tachycardia is expected, typically around 115 bpm. However, the patient's heart rate consistently exceeds this expected range. This elevated heart rate may be attributed to her baseline sinus tachycardia which was seen on prior EKGs from .  - She has no signs of heart failure or volume overload on physical exam.  - Can obtain TSH, Free and total T4  - Would recommend a 2D echocardiogram. If echocardiogram is unremarkable, no further inpatient workup from cardiology perspective. Outpatient cardiology f/u.

## 2024-07-26 NOTE — CONSULT NOTE ADULT - SUBJECTIVE AND OBJECTIVE BOX
Date of Admission: 24    CARDIAC HISTORY OF PRESENT ILLNESS:    35 YO F  at 36W4d by stated KALIN (24) presented for asymptomatic tachycardia noted during patient routine OB visit with midwife.   The patient reports a baseline heart rate that typically exceeds 100 bpm. She in not very active at baseline but states she can perform activities > 4 METs without chest pain or shortness of breath. She describes feeling mildly congested, a symptom unchanged since the onset of her pregnancy.   Patient denies vaginal bleeding, leakage of fluid, contractions, and endorses fetal movement. She denies dysuria, hematuria, fever, headache, SOB, chest pain, constipation, diarrhea, N/V.      PAST MEDICAL & SURGICAL HISTORY  Fibroid uterus    No significant past surgical history        FAMILY HISTORY:  Family hx of hypertension (Father, Mother)    FH: type 2 diabetes (Mother)    SOCIAL HISTORY:   - Non-smoker    REVIEW OF SYMPTOMS:  CONSTITUTIONAL: No fevers or chills.  EYES: No visual changes, eye pain, or discharge  ENT: No vertigo; No ear pain or change in hearing; No sore throat or difficulty swallowing  NECK: No pain or stiffness  RESPIRATORY: No cough, wheezing, or hemoptysis; No shortness of breath  CARDIOVASCULAR: No chest pain, chest pressure or palpitations  GASTROINTESTINAL: No abdominal or epigastric pain; No nausea, vomiting, or hematemesis; No diarrhea or constipation; No melena or hematochezia  GENITOURINARY: No dysuria, frequency or hematuria  MUSCULOSKELETAL: No joint pain, no muscle pain  NEUROLOGICAL: No numbness or weakness  SKIN: No itching or rashes    VITALS:  T(C): 37.1 (24 @ 18:01), Max: 37.1 (24 @ 18:01)  HR: 120 (24 @ 02:32) (111 - 129)  BP: 121/73 (24 @ 02:32) (110/68 - 136/80)  RR: 18 (24 @ 18:01) (18 - 18)  SpO2: 97% (24 @ 02:32) (95% - 100%)  Wt(kg): --  Daily     Daily     PHYSICAL EXAM:  GEN: Not in acute distress  HEENT: EOMI  LUNGS: Normal bs   CARDIOVASCULAR: RRR, S1/S2 present, no murmurs, rubs or gallops, no JVD  ABD: Non-tender   EXT: No KIRILL  SKIN: Warm  NEURO: AAOx3    LABS:	 	                        12.9   7.30  )-----------( 255      ( 2024 19:34 )             38.5         133<L>  |  102  |  6<L>  ----------------------------<  84  4.4   |  17  |  0.6<L>    Ca    9.6      2024 19:34    TPro  6.9  /  Alb  3.9  /  TBili  0.4  /  DBili  x   /  AST  29  /  ALT  28  /  AlkPhos  145<H>              Intake and Output:      CARDIAC MARKERS:      TELEMETRY EVENTS:    ECG:    RADIOLOGY:    OTHER:    Echocardiogram:    Catheterization:    Stress Test: 	    Home Medications:  acetaminophen 325 mg oral tablet: 3 tab(s) orally every 6 hours, As Needed (2022 02:59)  ibuprofen 600 mg oral tablet: 1 tab(s) orally every 6 hours, As Needed (2022 02:59)    MEDICATIONS  (STANDING):  lactated ringers Bolus 1000 milliLiter(s) IV Bolus once    MEDICATIONS  (PRN):

## 2024-07-27 DIAGNOSIS — R00.0 TACHYCARDIA, UNSPECIFIED: ICD-10-CM

## 2024-07-27 DIAGNOSIS — Z3A.36 36 WEEKS GESTATION OF PREGNANCY: ICD-10-CM

## 2024-07-27 DIAGNOSIS — O09.293 SUPERVISION OF PREGNANCY WITH OTHER POOR REPRODUCTIVE OR OBSTETRIC HISTORY, THIRD TRIMESTER: ICD-10-CM

## 2024-07-27 DIAGNOSIS — O26.893 OTHER SPECIFIED PREGNANCY RELATED CONDITIONS, THIRD TRIMESTER: ICD-10-CM

## 2024-07-27 LAB
CULTURE RESULTS: SIGNIFICANT CHANGE UP
SPECIMEN SOURCE: SIGNIFICANT CHANGE UP

## 2024-07-31 ENCOUNTER — NON-APPOINTMENT (OUTPATIENT)
Age: 34
End: 2024-07-31

## 2024-08-01 ENCOUNTER — APPOINTMENT (OUTPATIENT)
Dept: ANTEPARTUM | Facility: CLINIC | Age: 34
End: 2024-08-01
Payer: COMMERCIAL

## 2024-08-01 ENCOUNTER — OUTPATIENT (OUTPATIENT)
Dept: OUTPATIENT SERVICES | Facility: HOSPITAL | Age: 34
LOS: 1 days | End: 2024-08-01
Payer: COMMERCIAL

## 2024-08-01 ENCOUNTER — ASOB RESULT (OUTPATIENT)
Age: 34
End: 2024-08-01

## 2024-08-01 VITALS
DIASTOLIC BLOOD PRESSURE: 74 MMHG | BODY MASS INDEX: 32.59 KG/M2 | SYSTOLIC BLOOD PRESSURE: 107 MMHG | HEART RATE: 100 BPM | WEIGHT: 184 LBS | OXYGEN SATURATION: 100 %

## 2024-08-01 DIAGNOSIS — O09.90 SUPERVISION OF HIGH RISK PREGNANCY, UNSPECIFIED, UNSPECIFIED TRIMESTER: ICD-10-CM

## 2024-08-01 DIAGNOSIS — Z34.90 ENCOUNTER FOR SUPERVISION OF NORMAL PREGNANCY, UNSPECIFIED, UNSPECIFIED TRIMESTER: ICD-10-CM

## 2024-08-01 PROCEDURE — 76819 FETAL BIOPHYS PROFIL W/O NST: CPT | Mod: 26

## 2024-08-01 PROCEDURE — 99215 OFFICE O/P EST HI 40 MIN: CPT | Mod: 25

## 2024-08-01 PROCEDURE — 76819 FETAL BIOPHYS PROFIL W/O NST: CPT

## 2024-08-01 NOTE — HISTORY OF PRESENT ILLNESS
[FreeTextEntry1] : 25 M Att'g f/u Consult Note: Referred by LUCRETIA Paul Ms Stovall is a 33yo  at 37w4d (KALIN  by 2nd tri biometry), presents for consultation for persistent breech presentation.  Her pregnancy has also been c/b Hx 2nd trimester loss preceded by PPROM and enterococcal UTI. She continues to feel well. Denies abdominal pain, vaginal bleeding. No urinary symptoms. Taking ASA daily. Patient was evaluated on L&D on  for abdominal pain secondary to degenerating fibroid. She was given a course of indomethacin (she took 4 doses total) which completely resolved the pain. PMH: denies PSH: denies Meds: ASA, PNV, iron Allergies: None OB Hx: ) first trimester SAB, no d&c G2) PPROM at 18 weeks, s/p . Had Acinetobacter baumannii/nosocomialis group and Enterococcus faecalis UTI at time of diagnosis. Gyn Hx: 12.7x6.5x11.1 cm subserosal left lateral fibroid seen on sonogram. Some degenerative changes this pergnancy. Denies abnormal pap smears, fibroids, cysts, stds   Received indomethacin course several weeks ago for fibroid pain. Pain has not recurred. FH: Mother and father with HTN and diabetes. 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 Denies smoking, alcohol or drug use. Psych: denies  Px:  Very pleasant young woman here with her .  Appears well.  BP: 107/74, HR: 100bpm,  Wt: 184 lbs,  bpm Heart: RRR, no M/R/G Lungs: CTAB Abd: Fundus soft, nontender, MacD = 38cm.  LE: no erythema, edema or pain  Labs:  Bpos/AntibNeg;  HbEP AA.  Prenatal labs done at bioreference. Reviewed on patient's phone. NIPTs LR and baseline PELs wnl. . 3/4/24 AFP wnl 4/15: RV GBS neg; Genital Culture sent: Normal ivan + numerous MRSA:  sens Gent, Rifampin, TMPs, VAnco.  OBUS: 3/25/24: 19w1d: vertex, posterior placenta, cervix 3.36cm 4/15: SFVtx at 20w2d, DVP 5.1cm, EFW 353g at 30th%ile for GA. CL 4.6cm. : SF, complete breech at 36w5d, EFW 2754g at 29th%ile, AC 24%.  DVP 7.4cm.  BPP .  : SFBreech at 37w4d, DVP 8.2cm, plac post no previa, BPP .  L lat myomas imaged: [id number], [id number]. Breech is high in pelvis.     A/P:  34y  at 37w4d, KALIN 24 by 2nd tri US, w/Hx second trimester loss preceded by PPROM, now referred for persistent breech presentation. 1. History of 2nd tri PPROM, positive UCx for enterococcus at that time.  Now UCx no growth. No evidence for vaginal enterococcal carriage  presently, and pt is GBS neg.  MRSA noted on genital culture, however would not treat unless symptomatic.  Sensitivities noted above.  2. Persistent complete breech presentation with Hx 12cm L lat fibroid, painful necrosis has responded to indo x 48h, no longer painful. We discussed options: - External version under regional anesthesia at 37-38w.  If successful, await spts labor.  If not successful, schedule ECSx.  - Expectant management with Elective 1o CSx at 39-39w6d if breech does not spontaneously vert to vertex or if ECV not successful.  -->Pt requests ECV.  Given GA, we would recommend as soon as possible.  We are in the process of attempting to schedule on 24, Monday.  Message left for pt to call me on my cell about scheduling ([phone]). 3. Pregnancy: D/C ASA, if not already done.   MD Berenice, FACOG Please call me with questions or concerns.

## 2024-08-05 DIAGNOSIS — O34.13 MATERNAL CARE FOR BENIGN TUMOR OF CORPUS UTERI, THIRD TRIMESTER: ICD-10-CM

## 2024-08-05 DIAGNOSIS — O09.213 SUPERVISION OF PREGNANCY WITH HISTORY OF PRE-TERM LABOR, THIRD TRIMESTER: ICD-10-CM

## 2024-08-05 DIAGNOSIS — O99.013 ANEMIA COMPLICATING PREGNANCY, THIRD TRIMESTER: ICD-10-CM

## 2024-08-05 DIAGNOSIS — O99.019 ANEMIA COMPLICATING PREGNANCY, UNSPECIFIED TRIMESTER: ICD-10-CM

## 2024-08-05 DIAGNOSIS — Z3A.37 37 WEEKS GESTATION OF PREGNANCY: ICD-10-CM

## 2024-08-05 DIAGNOSIS — O09.299 SUPERVISION OF PREGNANCY WITH OTHER POOR REPRODUCTIVE OR OBSTETRIC HISTORY, UNSPECIFIED TRIMESTER: ICD-10-CM

## 2024-08-07 ENCOUNTER — NON-APPOINTMENT (OUTPATIENT)
Age: 34
End: 2024-08-07

## 2024-08-07 ENCOUNTER — OUTPATIENT (OUTPATIENT)
Dept: INPATIENT UNIT | Facility: HOSPITAL | Age: 34
LOS: 1 days | Discharge: HOME | End: 2024-08-07
Payer: COMMERCIAL

## 2024-08-07 VITALS — DIASTOLIC BLOOD PRESSURE: 63 MMHG | HEART RATE: 105 BPM | SYSTOLIC BLOOD PRESSURE: 117 MMHG

## 2024-08-07 VITALS — SYSTOLIC BLOOD PRESSURE: 121 MMHG | DIASTOLIC BLOOD PRESSURE: 78 MMHG | HEART RATE: 103 BPM

## 2024-08-07 DIAGNOSIS — O26.899 OTHER SPECIFIED PREGNANCY RELATED CONDITIONS, UNSPECIFIED TRIMESTER: ICD-10-CM

## 2024-08-07 LAB
BASOPHILS # BLD AUTO: 0 K/UL — SIGNIFICANT CHANGE UP (ref 0–0.2)
BASOPHILS NFR BLD AUTO: 0 % — SIGNIFICANT CHANGE UP (ref 0–1)
BLD GP AB SCN SERPL QL: SIGNIFICANT CHANGE UP
EOSINOPHIL # BLD AUTO: 0 K/UL — SIGNIFICANT CHANGE UP (ref 0–0.7)
EOSINOPHIL NFR BLD AUTO: 0 % — SIGNIFICANT CHANGE UP (ref 0–8)
GIANT PLATELETS BLD QL SMEAR: PRESENT — SIGNIFICANT CHANGE UP
HCT VFR BLD CALC: 39.5 % — SIGNIFICANT CHANGE UP (ref 37–47)
HGB BLD-MCNC: 13.4 G/DL — SIGNIFICANT CHANGE UP (ref 12–16)
LYMPHOCYTES # BLD AUTO: 2.08 K/UL — SIGNIFICANT CHANGE UP (ref 1.2–3.4)
LYMPHOCYTES # BLD AUTO: 30.4 % — SIGNIFICANT CHANGE UP (ref 20.5–51.1)
MANUAL SMEAR VERIFICATION: SIGNIFICANT CHANGE UP
MCHC RBC-ENTMCNC: 33.3 PG — HIGH (ref 27–31)
MCHC RBC-ENTMCNC: 33.9 G/DL — SIGNIFICANT CHANGE UP (ref 32–37)
MCV RBC AUTO: 98.3 FL — SIGNIFICANT CHANGE UP (ref 81–99)
MONOCYTES # BLD AUTO: 0.53 K/UL — SIGNIFICANT CHANGE UP (ref 0.1–0.6)
MONOCYTES NFR BLD AUTO: 7.8 % — SIGNIFICANT CHANGE UP (ref 1.7–9.3)
NEUTROPHILS # BLD AUTO: 4.22 K/UL — SIGNIFICANT CHANGE UP (ref 1.4–6.5)
NEUTROPHILS NFR BLD AUTO: 61.8 % — SIGNIFICANT CHANGE UP (ref 42.2–75.2)
PLAT MORPH BLD: NORMAL — SIGNIFICANT CHANGE UP
PLATELET # BLD AUTO: 261 K/UL — SIGNIFICANT CHANGE UP (ref 130–400)
PMV BLD: 10.3 FL — SIGNIFICANT CHANGE UP (ref 7.4–10.4)
RBC # BLD: 4.02 M/UL — LOW (ref 4.2–5.4)
RBC # FLD: 13.6 % — SIGNIFICANT CHANGE UP (ref 11.5–14.5)
RBC BLD AUTO: NORMAL — SIGNIFICANT CHANGE UP
WBC # BLD: 6.83 K/UL — SIGNIFICANT CHANGE UP (ref 4.8–10.8)
WBC # FLD AUTO: 6.83 K/UL — SIGNIFICANT CHANGE UP (ref 4.8–10.8)

## 2024-08-07 PROCEDURE — 59025 FETAL NON-STRESS TEST: CPT

## 2024-08-07 PROCEDURE — 36415 COLL VENOUS BLD VENIPUNCTURE: CPT

## 2024-08-07 PROCEDURE — 86901 BLOOD TYPING SEROLOGIC RH(D): CPT

## 2024-08-07 PROCEDURE — 86850 RBC ANTIBODY SCREEN: CPT

## 2024-08-07 PROCEDURE — 86900 BLOOD TYPING SEROLOGIC ABO: CPT

## 2024-08-07 PROCEDURE — 85025 COMPLETE CBC W/AUTO DIFF WBC: CPT

## 2024-08-07 PROCEDURE — 59412 ANTEPARTUM MANIPULATION: CPT

## 2024-08-07 RX ORDER — TERBUTALINE SULFATE 2.5 MG/1
0.25 TABLET ORAL ONCE
Refills: 0 | Status: COMPLETED | OUTPATIENT
Start: 2024-08-07 | End: 2024-08-07

## 2024-08-07 RX ORDER — DEXTROSE MONOHYDRATE, SODIUM CHLORIDE, SODIUM LACTATE, CALCIUM CHLORIDE, MAGNESIUM CHLORIDE 1.5; 538; 448; 18.4; 5.08 G/100ML; MG/100ML; MG/100ML; MG/100ML; MG/100ML
1000 SOLUTION INTRAPERITONEAL
Refills: 0 | Status: DISCONTINUED | OUTPATIENT
Start: 2024-08-07 | End: 2024-08-07

## 2024-08-07 RX ADMIN — TERBUTALINE SULFATE 0.25 MILLIGRAM(S): 2.5 TABLET ORAL at 14:04

## 2024-08-07 NOTE — OB PROVIDER TRIAGE NOTE - HISTORY OF PRESENT ILLNESS
Pt is a 34y.o.  @ 38.3wks by LMP presents for ECV for breech presentation. Pt denies ctx, LOF or VB and reports good FM. Pt receives prenatal care at [facility] and denies complications to this pregnancy

## 2024-08-07 NOTE — OB RN TRIAGE NOTE - HEART RATE (BEATS/MIN)
103 Quality 110: Preventive Care And Screening: Influenza Immunization: Influenza Immunization Administered during Influenza season Detail Level: Detailed Quality 226: Preventive Care And Screening: Tobacco Use: Screening And Cessation Intervention: Patient screened for tobacco use and is an ex/non-smoker Quality 130: Documentation Of Current Medications In The Medical Record: Current Medications Documented

## 2024-08-07 NOTE — OB PROVIDER TRIAGE NOTE - NS_OBGYNHISTORY_OBGYN_ALL_OB_FT
G1) first trimester SAB, no d&c  G2) PPROM at 18 weeks, s/p . Had Acinetobacter baumannii/nosocomialis group and Enterococcus faecalis UTI at time of diagnosis.    GYNHx: 12.7x6.5x11.1 cm subserosal left lateral fibroid seen on sonogram. Some degenerative changes this pregnancy. Denies abnormal pap smears, fibroids, cysts, stds  Received indomethacin course several weeks ago for fibroid pain

## 2024-08-07 NOTE — CHART NOTE - NSCHARTNOTEFT_GEN_A_CORE
PG4  Note    Date: 2024  Surgeon: Dr. Ballard  Assistant(s):  Dr Branham  PreOp Dx: Breech presentation   PostOp Dx: Breech presentation  Procedure: External Cephalic Version  Findings: Stable fetal HR pre and post procedure    Clinical Note: Ms. Stovall is a 33 yo  @38w3d who presented to L&D for a scheduled external cephalic version. Risks of ECV, including but not limited to, uncommon events such as PROM, abruption, fetal traumatic injury, stillbirth, abnormal fHR, fetal bradycardia, possible emergency C/S, and failed ECV were discussed with the patient. The chance of failure may be about 40 - 50%.  Many variables affect success such as the patient's weight, placental location, estimated fetal weight, and gestational age at the time the ECV is attempted (in general 36 - 37 weeks is more favorable than 38 - 39 weeks).  Consent was obtained. After successful version, labor is not usually induced.  The usual plan is to await the spontaneous onset of labor at term.  The risk of re-version to breech is less than 10%.  If re-version to breech occurs, ECV may be tried again.    Procedure Note: A bedside ultrasound was performed which confirmed the single intrauterine pregnancy and a breech presentation. There was noted to be adequate fluid. 0.25mg SubQ terbutaline was given. The fetal pelvis was located in the maternal pelvis, spine along the materal left side, and head in the materna RUQ. Using manual pressure, the fetus was manipulated with gentle pressure from the palms against the buttock and posterior occupit to stimulate a backwards roll. This was attempted twice without success. Next, two attempts were made to try a forward roll. Fetal HRs were obtained in between and were reassuring the whole time. The fetus was unable to shift into the vertex presentation.  Following the procedure, she was noted to have a reassuring and reactive tracing.     She will be monitored in triage with cont efm/toco for one hour. If no regular CTX are noted and no signs of PROM, she will be discharged home with instructions on reasons to return to L&D and otherwise she will follow up for a scheduled  next . PG4  Note    Date: 2024  Surgeon: Dr. Ballard  Assistant(s):  Dr Branham  PreOp Dx: Breech presentation   PostOp Dx: Breech presentation  Procedure: External Cephalic Version  Findings: Stable fetal HR pre and post procedure    Clinical Note: Ms. Stovall is a 33 yo  @38w3d who presented to L&D for a scheduled external cephalic version. Risks of ECV, including but not limited to, uncommon events such as PROM, abruption, fetal traumatic injury, stillbirth, abnormal fHR, fetal bradycardia, possible emergency C/S, and failed ECV were discussed with the patient. The chance of failure may be about 40 - 50%.  Many variables affect success such as the patient's weight, placental location, estimated fetal weight, and gestational age at the time the ECV is attempted (in general 36 - 37 weeks is more favorable than 38 - 39 weeks).  Consent was obtained. After successful version, labor is not usually induced.  The usual plan is to await the spontaneous onset of labor at term.  The risk of re-version to breech is less than 10%.  If re-version to breech occurs, ECV may be tried again.    Procedure Note: A bedside ultrasound was performed which confirmed the single intrauterine pregnancy and a breech presentation. There was noted to be adequate fluid. 0.25mg SubQ terbutaline was given. The fetal pelvis was located in the maternal pelvis, spine along the maternal left side, and head in the materna RUQ. Using manual pressure, the fetus was manipulated with gentle pressure from the palms against the buttock and posterior occiput to stimulate a backwards roll. This was attempted twice without success. Next, two attempts were made to try a forward roll. Fetal HRs were obtained in between and were reassuring the whole time. The fetus was unable to shift into the vertex presentation.  Following the procedure, she was noted to have a reassuring and reactive tracing.     She will be monitored in triage with cont efm/toco for one hour. If no regular CTX are noted and no signs of PROM, she will be discharged home with instructions on reasons to return to L&D and otherwise she will follow up for a scheduled  next .      OB Attending:  Attempted ECV for breech presentation. Agree with above note. ECV unsuccessful. Patient to be observed on labor and delivery, then will be scheduled for primary  section.   Don ESPARZA [facility]

## 2024-08-07 NOTE — OB PROVIDER TRIAGE NOTE - NSOBPROVIDERNOTE_OBGYN_ALL_OB_FT
34y.o.  @ 38.3wks for external cephalic version, GBS negative, +fibroid uterus  - Observe in L&D  - IV hydration  - CBC and Type and screen sent 34y.o.  @ 38.3wks for external cephalic version, GBS negative, +fibroid uterus  - Observe in L&D  - IV hydration  - CBC and Type and screen sent    ADDENDUM: S/p failed ECV, see separate chart note for details. Reassuring maternal and fetal status. Discharged home with precautions and will schedule for C/S for breech presentation on .

## 2024-08-07 NOTE — OB PROVIDER TRIAGE NOTE - NSHPPHYSICALEXAM_GEN_ALL_CORE
T(C): --  HR: 103 (08-07-24 @ 12:00) (103 - 103)  BP: 121/78 (08-07-24 @ 12:00) (121/78 - 121/78)  RR: --  SpO2: --    Abd: gravid, soft, NT  VE: deferred  Ctx: irritability  FHR: 150 moderate variability, Reactive NST

## 2024-08-07 NOTE — OB PROVIDER TRIAGE NOTE - NSHPLABSRESULTS_GEN_ALL_CORE
1/25/2024                                                                              B positive  RPR nonreactive  Rubella immune  HIV nonreactive  HepB nonreactive    7/11 HIV NR  RPR NR  GBS negative      GTT 64/ 131/ 132/ 100    4/15 Vaginal culture MRSA positive, Urine culture 4/17 and 7/27 negative    Maternal ECHO WNL, EF 65%    3/25/24: 19w1d: vertex, posterior placenta, cervix 3.36cm  4/15: SFVtx at 20w2d, DVP 5.1cm, EFW 353g at 30th%ile for GA. CL 4.6cm.  7/26: SF, complete breech at 36w5d, EFW 2754g at 29th%ile, AC 24%. DVP 7.4cm. BPP 8/8.  8/1: SFBreech at 37w4d, DVP 8.2cm, plac post no previa, BPP 8/8. L lat myomas imaged: [id number], [id number]. Breech is high in pelvis.  ?

## 2024-08-08 ENCOUNTER — APPOINTMENT (OUTPATIENT)
Dept: ANTEPARTUM | Facility: CLINIC | Age: 34
End: 2024-08-08

## 2024-08-09 DIAGNOSIS — Z3A.38 38 WEEKS GESTATION OF PREGNANCY: ICD-10-CM

## 2024-08-15 RX ORDER — ASPIRIN 500 MG
0 TABLET ORAL
Refills: 0 | DISCHARGE

## 2024-09-20 ENCOUNTER — NON-APPOINTMENT (OUTPATIENT)
Age: 34
End: 2024-09-20

## 2025-02-21 ENCOUNTER — NON-APPOINTMENT (OUTPATIENT)
Age: 35
End: 2025-02-21

## 2025-04-18 NOTE — OB PROVIDER TRIAGE NOTE - NS_FHRVARIABILITY_OBGYN_ALL_OB
Moderate Variability
No. DARIN screening performed.  STOP BANG Legend: 0-2 = LOW Risk; 3-4 = INTERMEDIATE Risk; 5-8 = HIGH Risk